# Patient Record
Sex: FEMALE
[De-identification: names, ages, dates, MRNs, and addresses within clinical notes are randomized per-mention and may not be internally consistent; named-entity substitution may affect disease eponyms.]

---

## 2017-05-29 ENCOUNTER — HOSPITAL ENCOUNTER (INPATIENT)
Dept: HOSPITAL 42 - ED | Age: 62
LOS: 2 days | Discharge: HOME | DRG: 440 | End: 2017-05-31
Attending: FAMILY MEDICINE | Admitting: FAMILY MEDICINE
Payer: COMMERCIAL

## 2017-05-29 VITALS — BODY MASS INDEX: 21.2 KG/M2

## 2017-05-29 DIAGNOSIS — K21.9: ICD-10-CM

## 2017-05-29 DIAGNOSIS — F17.210: ICD-10-CM

## 2017-05-29 DIAGNOSIS — I34.0: ICD-10-CM

## 2017-05-29 DIAGNOSIS — M25.552: ICD-10-CM

## 2017-05-29 DIAGNOSIS — E87.6: ICD-10-CM

## 2017-05-29 DIAGNOSIS — G89.29: ICD-10-CM

## 2017-05-29 DIAGNOSIS — K85.90: Primary | ICD-10-CM

## 2017-05-29 DIAGNOSIS — I12.9: ICD-10-CM

## 2017-05-29 DIAGNOSIS — Z87.11: ICD-10-CM

## 2017-05-29 DIAGNOSIS — N18.2: ICD-10-CM

## 2017-05-29 DIAGNOSIS — Z95.810: ICD-10-CM

## 2017-05-29 DIAGNOSIS — Z96.641: ICD-10-CM

## 2017-05-29 DIAGNOSIS — I25.10: ICD-10-CM

## 2017-05-29 DIAGNOSIS — I48.0: ICD-10-CM

## 2017-05-29 LAB
ADD MANUAL DIFF?: NO
ALBUMIN/GLOB SERPL: 1.3 {RATIO} (ref 1.1–1.8)
ALP SERPL-CCNC: 77 U/L (ref 38–133)
ALT SERPL-CCNC: 24 U/L (ref 7–56)
AMYLASE SERPL-CCNC: 122 U/L (ref 35–125)
APPEARANCE UR: CLEAR
APTT BLD: 29.4 SECONDS (ref 23.7–30.8)
AST SERPL-CCNC: 23 U/L (ref 15–39)
BACTERIA #/AREA URNS HPF: (no result) /[HPF]
BASOPHILS # BLD AUTO: 0.02 K/MM3 (ref 0–2)
BASOPHILS NFR BLD: 0.2 % (ref 0–3)
BILIRUB SERPL-MCNC: 0.5 MG/DL (ref 0.2–1.3)
BILIRUB UR-MCNC: NEGATIVE MG/DL
BUN SERPL-MCNC: 21 MG/DL (ref 7–21)
CALCIUM SERPL-MCNC: 9.6 MG/DL (ref 8.4–10.5)
CHLORIDE SERPL-SCNC: 91 MMOL/L (ref 98–107)
CO2 SERPL-SCNC: 29 MMOL/L (ref 21–33)
COLOR UR: YELLOW
EOSINOPHIL # BLD: 0.1 10*3/UL (ref 0–0.7)
EOSINOPHIL NFR BLD: 1.1 % (ref 1.5–5)
ERYTHROCYTE [DISTWIDTH] IN BLOOD BY AUTOMATED COUNT: 13.5 % (ref 11.5–14.5)
GLOBULIN SER-MCNC: 3.4 GM/DL
GLUCOSE SERPL-MCNC: 112 MG/DL (ref 70–110)
GLUCOSE UR STRIP-MCNC: NEGATIVE MG/DL
GRANULOCYTES # BLD: 7.22 10*3/UL (ref 1.4–6.5)
GRANULOCYTES NFR BLD: 79.3 % (ref 50–68)
HCT VFR BLD CALC: 36.7 % (ref 36–48)
INR PPP: 0.97 (ref 0.93–1.08)
KETONES UR STRIP-MCNC: NEGATIVE MG/DL
LEUKOCYTE ESTERASE UR-ACNC: NEGATIVE LEU/UL
LIPASE SERPL-CCNC: 965 U/L (ref 23–300)
LYMPHOCYTES # BLD: 0.9 10*3/UL (ref 1.2–3.4)
LYMPHOCYTES NFR BLD AUTO: 10 % (ref 22–35)
MCH RBC QN AUTO: 35 PG (ref 25–35)
MCHC RBC AUTO-ENTMCNC: 34.6 G/DL (ref 31–37)
MCV RBC AUTO: 101.1 FL (ref 80–105)
MONOCYTES # BLD AUTO: 0.9 10*3/UL (ref 0.1–0.6)
MONOCYTES NFR BLD: 9.4 % (ref 1–6)
PH UR STRIP: 6.5 [PH] (ref 4.7–8)
PLATELET # BLD: 330 10^3/UL (ref 120–450)
PMV BLD AUTO: 9.8 FL (ref 7–11)
POTASSIUM SERPL-SCNC: 2.9 MMOL/L (ref 3.6–5)
PROT SERPL-MCNC: 7.7 G/DL (ref 5.8–8.3)
PROT UR STRIP-MCNC: (no result) MG/DL
RBC # UR STRIP: NEGATIVE /UL
RBC #/AREA URNS HPF: NEGATIVE /HPF (ref 0–2)
SODIUM SERPL-SCNC: 132 MMOL/L (ref 132–148)
SP GR UR STRIP: 1.01 (ref 1–1.03)
UROBILINOGEN UR STRIP-ACNC: 0.2 E.U./DL
WBC # BLD AUTO: 9.1 10^3/UL (ref 4.5–11)
WBC #/AREA URNS HPF: (no result) /HPF (ref 0–6)

## 2017-05-29 RX ADMIN — DEXTROSE AND SODIUM CHLORIDE SCH MLS/HR: 5; 450 INJECTION, SOLUTION INTRAVENOUS at 13:48

## 2017-05-29 RX ADMIN — MORPHINE SULFATE PRN MG: 2 INJECTION, SOLUTION INTRAMUSCULAR; INTRAVENOUS at 14:49

## 2017-05-29 NOTE — ED PDOC
Arrival/HPI





- General


Chief Complaint: Abdominal Pain


Time Seen by Provider: 05/29/17 08:39


Historian: Patient





- History of Present Illness


Narrative History of Present Illness (Text): 





Grazyna Moss is a 62 year old female, whose past medical history includes, 

who presents to the emergency department complaining of diffuse, worsening 

upper abdominal pain for one week. Patient states that she experiences 

associated dizziness, sweats, and nausea. Patient  went to an outpatient center 

who prescribed medications for acid reflux which brought no relief. Patient 

also went to her PMD three days ago, who took blood and told patient that she 

may have pancreatitis. PMD directed patient to the emergency department for 

further evaluation. Patient denies any fevers, recent injury, recent travels, 

trauma, vomiting, chest pain, or any other complaint at this time. Patient 

endorses that she is a daily drinker and has "a couple of drinks" every night, 

either gin or wine.  





PMD: Dr. Roth








Time/Duration: 1 week


Symptom Onset: Gradual


Symptom Course: Unchanged


Severity Level: Mild


Activities at Onset: Light


Context: Home





Past Medical History





- Provider Review


Nursing Documentation Reviewed: Yes





- Infectious Disease


Hx of Infectious Diseases: None





- Tetanus Immunization


Tetanus Immunization: Unknown





- Reproductive


Menopause: Yes





- Cardiac


Hx Hypertension: Yes


Hx Pacemaker: Yes





- Pulmonary


Hx Respiratory Disorders: No





- Neurological


Hx Neurological Disorder: No





- HEENT


Hx HEENT Disorder: Yes (wears glasses)





- Renal


Hx Renal Disorder: No





- Endocrine/Metabolic


Hx Endocrine Disorders: No





- Hematological/Oncological


Hx Blood Disorders: No





- Integumentary


Hx Dermatological Disorder: No





- Musculoskeletal/Rheumatological


Hx Musculoskeletal Disorders: No


Hx Arthritis: Yes ("all over")


Hx Falls: No


Hx Fractures: Yes (L wrist casted, R ft metatarsal casted)


Hx Osteoarthritis: Yes ("all over")





- Gastrointestinal


Hx Gastrointestinal Disorders: No


Hx Gastroesophageal Reflux: Yes


Other/Comment: hemorrhoids a "long time ago"





- Genitourinary/Gynecological


Hx Genitourinary Disorders: No





- Psychiatric


Hx Psychophysiologic Disorder: No


Hx Anxiety: Yes


Hx Substance Use: No





- Past Surgical History


Past Surgical History: Unable to Obtain





- Surgical History


Hx Cardiac Catheterization: Yes (2002 neg)


Hx Orthopedic Surgery: Yes (herniated disc c3 and 4,right hip replacement)


Other/Comment: R hip replacement





- Anesthesia


Hx Anesthesia: No


Hx Anesthesia Reactions: No


Hx Malignant Hyperthermia: No





- Suicidal Assessment


Feels Threatened In Home Enviroment: No





Family/Social History





- Physician Review


Nursing Documentation Reviewed: Yes


Family/Social History: No Known Family HX


Smoking Status: Former Smoker


Hx Alcohol Use: Yes (occasional)


Frequency of alcohol use: Socially


Hx Substance Use: No


Hx Substance Use Treatment: No





Allergies/Home Meds


Allergies/Adverse Reactions: 


Allergies





Penicillins Allergy (Verified 05/29/17 08:25)


 RASH








Home Medications: 


 Home Meds











 Medication  Instructions  Recorded  Confirmed


 


Valsartan/Hydrochlorothiazide 1 tab PO DAILY 11/04/14 05/29/17





[Valsartan and Hydrochlorothiazide   





12.5 mg-160]   


 


Amiodarone HCl [Pacerone] 100 mg PO DAILY 05/29/17 05/29/17


 


Metoprolol Succinate [Toprol XL] 100 mg PO DAILY 05/29/17 05/29/17














Review of Systems





- Review of Systems


Constitutional: Night Sweats.  absent: Fevers


Eyes: absent: Vision Changes


ENT: absent: Hearing Changes


Respiratory: absent: SOB, Cough


Cardiovascular: absent: Chest Pain


Gastrointestinal: Abdominal Pain, Nausea, Appetite Changes.  absent: Vomiting


Genitourinary Female: absent: Dysuria, Frequency


Musculoskeletal: absent: Arthralgias


Skin: absent: Rash, Pruritis


Neurological: Dizziness


Endocrine: absent: Polyuria


Hemo/Lymphatic: absent: Easy Bleeding


Psychiatric: absent: Depression





Physical Exam





- Physical Exam


Narrative Physical Exam (Text): 


Constitutional: No acute distress.


Head: Normocephalic.  Atraumatic.  


Eyes:  PERRL.


ENT:  Moist mucous membranes.


Neck:  Supple.


Cardiovascular:  Regular rate.


Chest: No tenderness.


Respiratory:  Clear to auscultation bilaterally.


GI:  Epigastric tenderness with guarding. 


Back:  No CVA tenderness.


Musculoskeletal:  No tenderness or swelling of extremities.


Skin:  No rash. 


Neurologic:  Alert, no focal deficit.





Vital Signs Reviewed: Yes


Vital Signs











  Temp Pulse Resp BP Pulse Ox


 


 05/29/17 11:29  98.3 F  60  18  100/70  100


 


 05/29/17 09:30   66  18  94/55 L  99


 


 05/29/17 08:38  97.8 F  80  18  100/59 L  97











Temperature: Afebrile


Blood Pressure: Hypotensive


Pulse: Regular


Respiratory Rate: Normal


Appearance: Positive for: Well-Appearing, Non-Toxic, Comfortable


Pain Distress: None


Mental Status: Positive for: Alert and Oriented X 3





Medical Decision Making


ED Course and Treatment: 


Impression:





62 year old female complaining of diffuse abdominal pain for one week.





Plan:


-- EKG 


-- Chest X-ray


-- Urinalysis and Urine Culture


-- Type and Screen


-- Labs


-- Morphine and IV Fluids


-- Reassess and disposition





Prior Visits:


Notes and results from previous visits were reviewed. Patient last seen in ED 

on 02/03/15 for shortness of breath and chest pain while shoveling snow. 

Patient was admitted to hospitalist care for further evaluation.





Progress Notes:





EKG:


Ordered, reviewed, and independently interpreted the EKG.


Rate : 73 BPM


Rhythm : NSR


Interpretation : No ST- T elevations or depressions. 


Comparison : No previous EKG for comparison.





05/29/17 09:55


Chest X-ray:


Creator : Chris Rahman MD


FINDINGS:


LUNGS:No active pulmonary disease.


PLEURA:No significant pleural effusion identified, no pneumothorax apparent.


CARDIOVASCULAR:Normal.


OSSEOUS STRUCTURES:No significant abnormalities.


VISUALIZED UPPER ABDOMEN:Normal.


OTHER FINDINGS:Dual lead pacemaker


IMPRESSION:


No active disease.





Dr. Crockett accepts patient to Dr. Roth's service. He states he will order GI 

consult at later time as patient does not have private GI. Agrees with US, 

potassium replacement, IVF, NPO.





- Lab Interpretations


Lab Results: 








 05/29/17 08:48 





 05/29/17 08:45 





 Lab Results





05/29/17 10:15: Urine Color Yellow, Urine Appearance Clear, Urine pH 6.5, Ur 

Specific Gravity 1.010, Urine Protein Trace H, Urine Glucose (UA) Negative, 

Urine Ketones Negative, Urine Blood Negative, Urine Nitrate Negative, Urine 

Bilirubin Negative, Urine Urobilinogen 0.2, Ur Leukocyte Esterase Negative, 

Urine RBC Negative, Urine WBC 0 - 2, Ur Epithelial Cells 1 - 3, Urine Bacteria 

Small


05/29/17 09:10: Blood Type A POSITIVE, Antibody Screen Negative, BBK History 

Checked Patient has bt


05/29/17 08:48: PT 10.5, INR 0.97, APTT 29.4


05/29/17 08:48: WBC 9.1  D, RBC 3.63, Hgb 12.7, Hct 36.7, .1, MCH 35.0, 

MCHC 34.6, RDW 13.5, Plt Count 330, MPV 9.8, Gran % 79.3 H, Lymph % (Auto) 10.0 

L, Mono % (Auto) 9.4 H, Eos % (Auto) 1.1 L, Baso % (Auto) 0.2, Gran # 7.22 H, 

Lymph # 0.9 L, Mono # 0.9 H, Eos # 0.1, Baso # 0.02


05/29/17 08:45: Sodium 132, Potassium 2.9 L*, Chloride 91 L, Carbon Dioxide 29, 

Anion Gap 15, BUN 21, Creatinine 1.5 H, Est GFR ( Amer) 42, Est GFR (Non-

Af Amer) 35, Random Glucose 112 H, Calcium 9.6, Total Bilirubin 0.5, AST 23, 

ALT 24, Alkaline Phosphatase 77, Lactate Dehydrogenase 360, Total Protein 7.7, 

Albumin 4.3, Globulin 3.4, Albumin/Globulin Ratio 1.3, Amylase 122, Lipase 965 H








I have reviewed the lab results: Yes





- RAD Interpretation


Radiology Orders: 








05/29/17 08:48


CHEST PORTABLE [RAD] Stat 





05/29/17 09:54


ABDOMEN COMPLETE [US] Stat 














- Medication Orders


Current Medication Orders: 








Sodium Chloride (Sodium Chloride 0.9%)  1,000 mls @ 100 mls/hr IV .Q10H KANDICE


   Last Admin: 05/29/17 11:48  Dose: 100 mls/hr





Discontinued Medications





Sodium Chloride (Sodium Chloride 0.9%)  1,000 mls @ 999 mls/hr IV .Q1H1M STA


   Stop: 05/29/17 09:48


   Last Admin: 05/29/17 09:10  Dose: 999 mls/hr





Potassium Chloride (Potassium Chloride 10 Meq/100 Ml)  10 meq in 100 mls @ 100 

mls/hr IVPB ONCE ONE


   Stop: 05/29/17 10:49


   Last Admin: 05/29/17 10:02  Dose: 100 mls/hr





Morphine Sulfate (Morphine)  2 mg IVP STAT STA


   Stop: 05/29/17 08:50


   Last Admin: 05/29/17 09:02  Dose: 2 mg





Re-Assess: MAR Pain Assessment


 Document     05/29/17 10:02  SRE  (Rec: 05/29/17 11:46  SRE  [id number])


     Pain Reassessment


      Is this a pain reassessment?               Yes


     Sleep


      Is patient sleeping during reassessment?   Yes


     Location


      Pain Location Body Site                    Abdomen














- Scribe Statement


The provider has reviewed the documentation as recorded by the Jhon Cox





Provider Scribe Attestation:


All medical record entries made by the Scribe were at my direction and 

personally dictated by me. I have reviewed the chart and agree that the record 

accurately reflects my personal performance of the history, physical exam, 

medical decision making, and the department course for this patient. I have 

also personally directed, reviewed, and agree with the discharge instructions 

and disposition.





Disposition/Present on Arrival





- Present on Arrival


Any Indicators Present on Arrival: No


History of DVT/PE: No


History of Uncontrolled Diabetes: No


Urinary Catheter: No


History of Decub. Ulcer: No


History Surgical Site Infection Following: None





- Disposition


Have Diagnosis and Disposition been Completed?: Yes


Diagnosis: 


 Pancreatitis, Hypokalemia





Disposition: HOSPITALIZED


Disposition Time: 09:55


Patient Plan: Admission


Condition: GUARDED

## 2017-05-29 NOTE — CARD
--------------- APPROVED REPORT --------------





EKG Measurement

Heart [id number]

ID [id number]

[id number]

[id number]

[id number]



<Conclusion>

*** Poor data quality, interpretation may be adversely affected

Normal sinus rhythm

Possible Left atrial enlargement

Septal infarct, age undetermined

Abnormal ECG

## 2017-05-29 NOTE — RAD
HISTORY:

epigastric pain  



COMPARISON:

03/02/2015 



FINDINGS:



LUNGS:

No active pulmonary disease.



PLEURA:

No significant pleural effusion identified, no pneumothorax apparent.



CARDIOVASCULAR:

Normal.



OSSEOUS STRUCTURES:

No significant abnormalities.



VISUALIZED UPPER ABDOMEN:

Normal.



OTHER FINDINGS:

Dual lead pacemaker



IMPRESSION:

No active disease.

## 2017-05-29 NOTE — HP
HISTORY OF PRESENT ILLNESS:  We were called earlier on today to consider 
admission for this 62-year-old female who was seen in our office on the , 
that is approximately 4 days ago, and subsequent labs done as she had some 
abdominal pain revealed an elevated lipase to the degree that Dr. Jodi George recommended admission on , but she is here today, .   On 
Emergency Room evaluation the patient complains of diffuse upper abdominal pain 
ongoing for 1 week.  In our office the added information was prior history of 
ulcers.  She also has some associated dizziness, sweats and nausea and had been 
evaluated prior to seeing us by an outpatient center who prescribed acid reflux 
medications before coming to the office and brought no relief.  The patient was 
also seen by Dr. Chavez and once the labs returned immediately the 
suspicion for pancreatitis due to elevated pancreatic enzymes.  She has had no 
fevers or recent injuries.  No recent travel and no vomiting to go along with 
the present abdominal pain.  She does admit to drinking a couple of drinks 
every night either gin or wine.  Review of her office chart is remarkable for 
essential hypertension, history of ventricular tachycardia and subsequent 
pacemaker insertion.  We are unable to find out at the moment who the 
cardiologist was.  She also had a history of macrocytosis, osteoarthritis of 
the lumber spine, cervical spine degeneration, left hip pain.  She continues to 
smoke, a history of first herpes zoster, CKD stage II, history of occasional 
dizziness and cough and ASHD.



PAST SURGICAL HISTORY:  Remarkable for laminectomy, right hip replacement in 
, pacemaker insertion and defibrillation was on 2015.



FAMILY HISTORY:  Remarkable for father who  at age 42 of heart disease.  
Her spouse is alive.  Her mother  at age 62 of liver disease.  She has 2 
sisters.



SOCIAL HISTORY:  She does not engage in any physical activity.  She lives with 
her spouse and works full time and smokes full time and admits to about 6-10 
cigarette habit a day.  The drinks admitted in our office are to 2-3 times a 
week and on a typical day she has 1-2 drinks and on no occasion does she ever 
go over 6.  She denies using any caffeine.



ALLERGIES:  PENICILLIN.



REVIEW OF SYSTEMS:  In the office were complaining of epigastric and mid 
abdominal pain for 4 days, now 7 days.  The pain was dull and constant, 
associated with a loss of appetite and bloating.  No fever, nausea or vomiting.
  The omeprazole yielded no relief and she has lost 8 pounds since 2 weeks 
before the present visit.



PHYSICAL EXAMINATION:   From the emergency room:  

VITAL SIGNS:  Show a temperature of 97.8, a weight of 124; weighing 121 in our 
office 2 days ago.  She is with regular pulse of 66, blood pressure 100/70, 
respiratory rate of 18 with 100% O2 sat by pulse oximetry.

GENERAL:  Alert and oriented, in mild discomfort.  The pain scale in the office 
was a 4/10.  She was conversant in the office and declined immediate referral 
to the Emergency Room when Dr. Chavez spoke with her after the results 
were evident on Friday and she was endorsed to me on the weekend as we were 
expecting an admission.  Blood pressure in the office was normal at 121/73 
versus our hypotensive notes here.

HEENT:  Normocephalic, atraumatic.  Nose:  Septum is intact and no mucosal 
erythema.  Throat:  Clear with no lesions on the pharynx.

NECK:  No cervical lymphadenopathy.  Neck is supple, with full range of motion.
  Thyroid appears normal.

CARDIAC:  Showed no murmurs, rubs or gallops, was regular in rate and rhythm.

LUNGS:  Have diminished breath sounds with no wheezes, rales or rhonchi.

ABDOMEN:  Soft with no guarding and bowel sounds were present.  There is 
tenderness on deep palpation of the epigastric and periumbilical area.

EXTREMITIES:  Showed no clubbing, cyanosis or edema.

NEUROLOGIC:  Intact.



LABORATORY DATA:  On admission showed WBC count of 9100 with a slight shift to 
the left with 79.3% granulocytes and 10% lymphocytes. however does not mention 
any bands.  PT/INRs were normal.  Potassium is low, so she was immediately 
supplemented.  Has no diarrhea, so we are not sure of the etiology of the 
hypokalemia.  Creatinine is 1.5, glucose is 112, amylase is 122, lipase is 965 
and in the office was in excess of 1200.  Urine color is fair, clear.  Chest x-
ray was consistent just with pacemaker visualization, otherwise unremarkable.  
The patient received a K rider and was placed on IV fluids.  We have now 
changed the rate to more and she was given morphine in the ER with resolution.  
Review of her labs show that she is on omeprazole 40, amiodarone 200, 
hydrochlorothiazide 12.5, valsartan 80, clonazepam 1 mg twice a day, metoprolol 
.



IMPRESSION:  Pancreatitis, abdominal pain, evaluate with GI.



SECONDARY DIAGNOSIS:  History of pacemaker with continued use of amiodarone.  
We will ask for a cardiac consult with Dr. Catalan.



PLAN:  Continue at present her IV fluids, keep n.p.o. and await for further 
recommendations from cardio and GI.  Serial labs were ordered for tomorrow and 
she will return under the care of Dr. Chavez as we are covering for 
her today.





__________________________________________

Manjinder Crockett MD







cc:   



DD: 2017 13:08:03  73

TT: 2017 18:16:21

Job # [id number]

kaitlin FELDMAN

## 2017-05-30 LAB
ADD MANUAL DIFF?: NO
ALBUMIN/GLOB SERPL: 1.1 {RATIO} (ref 1.1–1.8)
ALP SERPL-CCNC: 68 U/L (ref 38–133)
ALT SERPL-CCNC: 20 U/L (ref 7–56)
AMYLASE SERPL-CCNC: 102 U/L (ref 35–125)
AST SERPL-CCNC: 23 U/L (ref 15–39)
BASOPHILS # BLD AUTO: 0.03 K/MM3 (ref 0–2)
BASOPHILS NFR BLD: 0.5 % (ref 0–3)
BILIRUB SERPL-MCNC: 0.3 MG/DL (ref 0.2–1.3)
BUN SERPL-MCNC: 14 MG/DL (ref 7–21)
CALCIUM SERPL-MCNC: 9.2 MG/DL (ref 8.4–10.5)
CHLORIDE SERPL-SCNC: 96 MMOL/L (ref 98–107)
CO2 SERPL-SCNC: 33 MMOL/L (ref 21–33)
CRP SERPL-MCNC: 6.9 MG/DL (ref ?–0.8)
EOSINOPHIL # BLD: 0.2 10*3/UL (ref 0–0.7)
EOSINOPHIL NFR BLD: 3.7 % (ref 1.5–5)
ERYTHROCYTE [DISTWIDTH] IN BLOOD BY AUTOMATED COUNT: 13.6 % (ref 11.5–14.5)
GLOBULIN SER-MCNC: 3.4 GM/DL
GLUCOSE SERPL-MCNC: 118 MG/DL (ref 70–110)
GRANULOCYTES # BLD: 4.09 10*3/UL (ref 1.4–6.5)
GRANULOCYTES NFR BLD: 63.8 % (ref 50–68)
HCT VFR BLD CALC: 35.3 % (ref 36–48)
LIPASE SERPL-CCNC: 649 U/L (ref 23–300)
LYMPHOCYTES # BLD: 1.4 10*3/UL (ref 1.2–3.4)
LYMPHOCYTES NFR BLD AUTO: 21.2 % (ref 22–35)
MCH RBC QN AUTO: 35.6 PG (ref 25–35)
MCHC RBC AUTO-ENTMCNC: 34.6 G/DL (ref 31–37)
MCV RBC AUTO: 102.9 FL (ref 80–105)
MONOCYTES # BLD AUTO: 0.7 10*3/UL (ref 0.1–0.6)
MONOCYTES NFR BLD: 10.8 % (ref 1–6)
PLATELET # BLD: 309 10^3/UL (ref 120–450)
PMV BLD AUTO: 9.8 FL (ref 7–11)
POTASSIUM SERPL-SCNC: 3.2 MMOL/L (ref 3.6–5)
PROT SERPL-MCNC: 7.1 G/DL (ref 5.8–8.3)
SODIUM SERPL-SCNC: 136 MMOL/L (ref 132–148)
WBC # BLD AUTO: 6.4 10^3/UL (ref 4.5–11)

## 2017-05-30 RX ADMIN — DEXTROSE AND SODIUM CHLORIDE SCH MLS/HR: 5; 450 INJECTION, SOLUTION INTRAVENOUS at 22:12

## 2017-05-30 RX ADMIN — MORPHINE SULFATE PRN MG: 2 INJECTION, SOLUTION INTRAMUSCULAR; INTRAVENOUS at 20:19

## 2017-05-30 RX ADMIN — MORPHINE SULFATE PRN MG: 2 INJECTION, SOLUTION INTRAMUSCULAR; INTRAVENOUS at 11:26

## 2017-05-30 RX ADMIN — METOPROLOL SUCCINATE SCH MG: 100 TABLET, EXTENDED RELEASE ORAL at 10:27

## 2017-05-30 RX ADMIN — MORPHINE SULFATE PRN MG: 2 INJECTION, SOLUTION INTRAMUSCULAR; INTRAVENOUS at 04:32

## 2017-05-30 RX ADMIN — DEXTROSE AND SODIUM CHLORIDE SCH MLS/HR: 5; 450 INJECTION, SOLUTION INTRAVENOUS at 04:32

## 2017-05-30 NOTE — CON
DATE: 05/30/2017



REQUESTING PHYSICIAN:  Dr. Crockett.  



REASON FOR CONSULTATION:  I have been asked to see this 62-year-old female who comes to the hospital 
with a 1-week history of progressively worsening diffuse abdominal pain.  The patient states that the
 pain is colicky in nature throughout the abdomen and extending into the substernal area.  Pain becam
e progressively worse over several days prompting the patient to go to her medical doctor.  Routine b
lood work in her medical doctor's office apparently showed elevated amylase and lipase at which point
 the patient was instructed to come to the Emergency Room.  In the Emergency Room, the patient was fo
und to have an elevated lipase of 965 with a normal amylase.  Ultrasound of the abdomen was negative 
for stones or bile duct dilatation.  The liver appeared normal.  She denies any nausea or vomiting, f
ruth ore chills.  The patient was diagnosed with peptic ulcer disease 20 years ago for which she too
k PPI for several years but stopped years ago.



PAST MEDICAL HISTORY:  Notable for hypertension, ventricular tachycardia requiring pacemaker insertio
n, DJD of lumbar spine, chronic left hip pain, nicotine use, CKD stage II, and coronary artery diseas
e.



PAST SURGICAL HISTORY:  Notable for permanent pacemaker placement, right hip replacement, laminectomy
.



SOCIAL HISTORY:  She smokes up to a half pack of cigarettes per day.  She consumes alcohol socially.



FAMILY HISTORY:  Notable for father dying of heart disease in his 40s.  Mother with liver disease in 
her 60s at that time of her death.



REVIEW OF SYSTEMS:  A 14-point review of systems is notable for abdominal pain, substernal chest pain
, loss of appetite.



PHYSICAL EXAMINATION:

GENERAL:  Well-developed female lying in bed, comfortable.  Her abdominal pain has greatly improved.

VITAL SIGNS:  Reveal temperature of 97.7, blood pressure 108/68, heart rate is 60.

HEENT:  Reveals sclerae to be white, conjunctivae pink.

NECK:  Supple.

CHEST:  Lungs are clear.

HEART:  Reveals regular rate and rhythm.

ABDOMEN:  Soft, nontender, no mass.

EXTREMITIES:  Show no edema.



LABORATORY DATA:  Reveal white blood cell count 6.4, hemoglobin 12.2.  Chemistries reveal electrolyte
s with potassium of 3.2, lipase down to 649, amylase is normal.



IMPRESSION:  A 62-year-old female with a 1-week history of diffuse abdominal pain, history of peptic 
ulcer disease with ultrasound negative for gallstones, suggestive of pancreatitis.  Etiology of pancr
eatitis is unclear at this point. 



RECOMMENDATIONS:

1.  Will check serum triglyceride level.

2.  Will request a CT of the abdomen with pancreatic protocol.

3.  Will start the patient on a clear liquid diet.





__________________________________________

Alexey Lakhani MD







cc:



DD: 05/30/2017 11:07:26  79

TT: 05/30/2017 11:33:51

Confirmation # [id number]

Dictation # [id number]

mn

## 2017-05-30 NOTE — CON
DATE: 2017



SERVICE:  Cardiology.



CONSULTING PHYSICIAN:  Dr. Claudio Catalan.



REASON FOR CONSULTATION:  Cardiac evaluation and adjustment of cardiac medications, history of pacema
ker, admitted with acute pancreatitis.



BRIEF CLINICAL HISTORY:  This is a 62-year-old female with past medical history significant for hyper
tension, paroxysmal atrial fibrillation, ventricular tachycardia, status post permanent pacemaker by 
Dr. Hawkins at [facility] in 3/2015, admitted with acute pancreatitis.  Denies any michael
st pain, denies any shortness of breath, denies any palpitation.  Denies any dyspnea on exertion.



PAST MEDICAL HISTORY:  Significant for hypertension, paroxysmal atrial fibrillation, sick sinus syndr
ome possibly, status post pacemaker at [facility], status post permanent pacemaker 3/
2015.



PAST SURGICAL HISTORY:  Significant for laminectomy in the past, right hip replacement in , jeni
ectomy , status post pacemaker in 3/2015.



FAMILY HISTORY:  Significant for brother  at the age of 42 with heart problems.



SOCIAL HISTORY:  No history of smoking.  No history of alcohol abuse.



ALLERGIES:  PENICILLIN.



CURRENT MEDICATIONS:  The patient is taking amiodarone 200 mg daily, Toprol- mg daily, valsarta
n 12.5/160.



REVIEW OF SYSTEMS:  As per HPI.



PHYSICAL EXAMINATION:

VITAL SIGNS:  Temperature afebrile, heart rate 60, blood pressure 108/68.

HEENT:  PERRLA.  Extraocular muscles intact.

NECK:  Supple.  No carotid bruits.  No thyromegaly.

CHEST:  Clear to auscultation.

HEART:  S1, S2 regular.

ABDOMEN:  Soft.

EXTREMITIES:  Clubbing and cyanosis negative.



LABORATORY DATA:  Blood workup as follows:  WBC 6.4, hemoglobin 12.1, hematocrit 35.3, platelet count
 309.  Chemistry shows sodium 137, potassium 3.2, chloride 97, carbon dioxide 33, anion gap of 10, BU
N 14, creatinine 1.0.  Triglyceride 199, amylase 102, and lipase 609.



IMPRESSION:  Hypokalemia, acute pancreatitis, history of sick sinus syndrome, status post permanent p
acemaker 3/2015 at [facility] after having paroxysmal atrial fibrillation and ventric
ular tachycardia.  History of hip replacement in , history of pacemaker 3/2015, history of robin
ctomy .  The patient had echocardiography done, normal chamber size, ejection fraction 55%, mild 
mitral regurgitation dated 03/3/2015.



RECOMMENDATION:  We will continue amiodarone 100 mg daily, continue losartan.  We will put metoprolol
 100 mg daily.  Will follow with you.



Thank you, Dr. Crockett, for providing us the opportunity in taking care of the patient.  



We will get lipid profile, TSH, hemoglobin A1c also.





__________________________________________

Claudio Catalan MD







cc:



DD: 2017 11:55:17  305

TT: 2017 13:10:13

Confirmation # [id number]

Dictation # [id number]

tn

## 2017-05-30 NOTE — PN
DATE: 05/30/2017



SUBJECTIVE:  The patient was admitted for acute pancreatitis yesterday morning.
  She is n.p.o.  She is feeling better.  Her pain improved.  The patient denies 
any nausea or vomiting.  She denies any diarrhea.



PHYSICAL EXAMINATION:

VITAL SIGNS:  The patient is afebrile.  Temperature 97, pulse 60 regular, blood 
pressure 108/68, respiratory rate 20.  Her oxygen saturation is 99% on room air.

GENERAL:  The patient is comfortable in bed, alert, awake, oriented.

HEENT:  Head is normocephalic, atraumatic.  Oral mucosa is moist.

NECK:  Supple.

LUNGS:  Clear to auscultation.

HEART:  Regular rhythm and rate.

ABDOMEN:  Soft.  There is slight tenderness on deep palpation in the epigastric 
area.  Bowel sounds are positive.

EXTREMITIES:  With no edema.



LABORATORY DATA:  This morning, CBC with stable WBC 6.4, hemoglobin 12.2, 
hematocrit 35.3.  Chemistry with normal sodium 132, potassium improved to 3.2 
this morning.  Her renal function is normal.  Her pancreatic enzymes/lipase is 
trending down; it is 649 this morning.  Abdominal ultrasound showed normal 
pancreas with no signs of inflammation or pseudocysts.  



ASSESSMENT:

1.  Acute pancreatitis, clinically improved with trending down lipase level.

2.  Hypokalemia.

3.  History of ventricular tachycardia, status post defibrillator.



PLAN OF TREATMENT:  Continue IV fluids.  Will replace potassium.  Will start 
clear liquid if okay with GI.





__________________________________________

Amanda Chavez MD







cc:   



DD: 05/30/2017 09:16:27  154

TT: 05/30/2017 09:38:30

Confirmation # [id number]

Dictation # [id number]

kaitlin FELDMAN

## 2017-05-30 NOTE — US
HISTORY:

epigastric pain



COMPARISON:

None.



TECHNIQUE:

Sonographic evaluation of the abdomen.



FINDINGS:



LIVER:

Measures 14.7 cm.  Normal echogenicity of the liver parenchyma. No 

mass. No intrahepatic bile duct dilatation.



GALLBLADDER:

Unremarkable. No gallstones.



COMMON BILE DUCT:

Measures 3 mm. No stones. No dilatation.



PANCREAS:

Unremarkable as visualized. No mass. No ductal dilatation.



RIGHT KIDNEY:

Measures 9.8cm. Normal echogenicity. No calculus, mass, or 

hydronephrosis.



LEFT KIDNEY:

Measures 10.1cm. Normal echogenicity. No calculus, mass, or 

hydronephrosis.



SPLEEN:

Normal in size and contour. No mass.



AORTA:

No aneurysmal dilatation. 



IVC:

Unremarkable. 



OTHER FINDINGS:

None. 



IMPRESSION:

Unremarkable abdominal sonogram.

## 2017-05-31 VITALS — RESPIRATION RATE: 20 BRPM | OXYGEN SATURATION: 97 % | TEMPERATURE: 98.1 F | HEART RATE: 60 BPM

## 2017-05-31 VITALS — DIASTOLIC BLOOD PRESSURE: 61 MMHG | SYSTOLIC BLOOD PRESSURE: 94 MMHG

## 2017-05-31 LAB
ADD MANUAL DIFF?: NO
ALBUMIN/GLOB SERPL: 1.1 {RATIO} (ref 1.1–1.8)
ALP SERPL-CCNC: 59 U/L (ref 38–133)
ALT SERPL-CCNC: 21 U/L (ref 7–56)
AST SERPL-CCNC: 19 U/L (ref 15–39)
BASOPHILS # BLD AUTO: 0.03 K/MM3 (ref 0–2)
BASOPHILS NFR BLD: 0.5 % (ref 0–3)
BILIRUB SERPL-MCNC: 0.4 MG/DL (ref 0.2–1.3)
BUN SERPL-MCNC: 9 MG/DL (ref 7–21)
CALCIUM SERPL-MCNC: 9.1 MG/DL (ref 8.4–10.5)
CHLORIDE SERPL-SCNC: 100 MMOL/L (ref 98–107)
CO2 SERPL-SCNC: 31 MMOL/L (ref 21–33)
EOSINOPHIL # BLD: 0.3 10*3/UL (ref 0–0.7)
EOSINOPHIL NFR BLD: 4.7 % (ref 1.5–5)
ERYTHROCYTE [DISTWIDTH] IN BLOOD BY AUTOMATED COUNT: 13.6 % (ref 11.5–14.5)
GLOBULIN SER-MCNC: 3 GM/DL
GLUCOSE SERPL-MCNC: 127 MG/DL (ref 70–110)
GRANULOCYTES # BLD: 3.38 10*3/UL (ref 1.4–6.5)
GRANULOCYTES NFR BLD: 59 % (ref 50–68)
HCT VFR BLD CALC: 33.9 % (ref 36–48)
LIPASE SERPL-CCNC: 485 U/L (ref 23–300)
LYMPHOCYTES # BLD: 1.5 10*3/UL (ref 1.2–3.4)
LYMPHOCYTES NFR BLD AUTO: 26.4 % (ref 22–35)
MAGNESIUM SERPL-MCNC: 1.9 MG/DL (ref 1.7–2.2)
MCH RBC QN AUTO: 35.1 PG (ref 25–35)
MCHC RBC AUTO-ENTMCNC: 33.9 G/DL (ref 31–37)
MCV RBC AUTO: 103.4 FL (ref 80–105)
MONOCYTES # BLD AUTO: 0.5 10*3/UL (ref 0.1–0.6)
MONOCYTES NFR BLD: 9.4 % (ref 1–6)
PHOSPHATE SERPL-MCNC: 2.9 MG/DL (ref 2.5–4.5)
PLATELET # BLD: 312 10^3/UL (ref 120–450)
PMV BLD AUTO: 9.7 FL (ref 7–11)
POTASSIUM SERPL-SCNC: 3.6 MMOL/L (ref 3.6–5)
PROT SERPL-MCNC: 6.4 G/DL (ref 5.8–8.3)
SODIUM SERPL-SCNC: 135 MMOL/L (ref 132–148)
WBC # BLD AUTO: 5.7 10^3/UL (ref 4.5–11)

## 2017-05-31 RX ADMIN — METOPROLOL SUCCINATE SCH: 100 TABLET, EXTENDED RELEASE ORAL at 12:08

## 2017-05-31 RX ADMIN — MORPHINE SULFATE PRN MG: 2 INJECTION, SOLUTION INTRAMUSCULAR; INTRAVENOUS at 05:49

## 2017-05-31 NOTE — PN
DATE: 05/31/2017



SUBJECTIVE:  The patient is lying in bed, comfortable.  The patient states that her abdominal pain ha
s essentially resolved.  She denies any nausea, vomiting.



PHYSICAL EXAMINATION:

VITAL SIGNS:  Reveal temperature of 98.1, blood pressure of 94/60, heart rate of 60.

ABDOMEN:  Soft, nontender.



LABORATORY DATA:  Hemoglobin is 11.5.  Chemistries reveal lipase down to 485.  Triple phase CT scan o
f the abdomen with pancreatic protocol results are pending.



IMPRESSION:  A 62-year-old female with history of peptic ulcer disease with increasing abdominal pain
, elevated lipase, rule out pancreatitis.  Her serum triglyceride level is 199.  Her lipase is trendi
ng downward.



RECOMMENDATIONS:

1.  Await results of CT scan of the abdomen with pancreatic protocol.

2.  Advance diet as tolerated.  If CT of the pancreas is okay, the patient can be discharged home wit
h plans for outpatient endoscopy.





__________________________________________

Alexey Lakhani MD







cc:



DD: 05/31/2017 10:19:05  79

TT: 05/31/2017 10:35:15

Confirmation # [id number]

Dictation # [id number]

tn

## 2017-05-31 NOTE — DS
HISTORY OF PRESENT ILLNESS:  The patient was admitted for acute pancreatitis 
with elevated lipase at 900 range.  The patient is feeling better.  The patient 
is tolerating diet.  She denies any nausea, vomiting.  Her abdominal pain 
improved significantly.



PHYSICAL EXAMINATION:

VITAL SIGNS:  Stable.  Temperature 98.1, blood pressure 98/68, respiratory rate 
20, pulse 60 regular.

GENERAL:  The patient is alert, awake, oriented x 3.

HEENT:  Head is normocephalic, atraumatic.  Oral mucosa is moist.

NECK:  Supple.

LUNGS:  Clear to auscultation.

HEART:  Regular rhythm and rate.

ABDOMEN:  Soft.  There is slight tenderness on very deep palpation in the 
epigastric area, but no rebound or muscle guarding or masses palpable.  Bowel 
sounds are positive.

EXTREMITIES:  With no edema.



LABORATORY DATA:  This morning, stable CBC with WBC 5.7, hemoglobin 11.5, 
hematocrit 33.9, platelet count 312.  Chemistry with normal sodium 135, 
potassium 3.6, normal renal function.  Her lipase is trending down it is at 485 
this morning, down from 649 yesterday.  Her abdominal ultrasound was normal.  
The patient had pancreatic CT scan, was reported as mild pancreatitis.



DISCHARGE DIAGNOSES:

1.  Acute pancreatitis, clinically improved with uncertain etiology.  Negative 
for gallstones.

2.  History of hypokalemia, improved.

3.  History of ventricular tachycardia, status post defibrillator.



PLAN OF TREATMENT:  The patient will be discharged home today.  She will be on 
cardiac healthy diet and was advised to avoid fried food, avoid alcohol.  The 
patient will be maintained on her chronic medications:  Amiodarone 100 mg daily
, losartan /12.5 daily and metoprolol succinate 100 mg daily.  She will 
get a prescription for Percocet 5/325 one tablet as needed for pain.  She was 
advised to follow up with primary care doctor next week on Tuesday.  She will 
also make appointment with gastroenterologist in 2 weeks.  Will need endoscopy 
and colonoscopy outpatient.





__________________________________________

Amanda Chavez MD







cc:   



DD: 05/31/2017 16:14:47  154

TT: 05/31/2017 17:14:09

Job # [id number]

jn

FRANCIA

## 2017-05-31 NOTE — PN
DATE: 05/31/2017



The patient is in room 373, bed 3.



REASON FOR CONSULTATION AND FOLLOWUP:  Acute pancreatitis, history of pacemaker insertion, paroxysmal
 atrial fibrillation, ventricular tachycardia.



HISTORY OF PRESENT ILLNESS:  The patient is a 62-year-old female with past medical history significan
t for hypertension, paroxysmal atrial fibrillation, ventricular tachycardia, status post permanent pa
cemaker insertion by Dr. Hawkins at [facility] in 3/2015, admitted with acute pancreat
itis.  The patient lying flat in bed without any chest pain, shortness of breath, or palpitation.



PHYSICAL EXAMINATION:

VITAL SIGNS:  Blood pressure 94/60, respirations 20, pulse 60, temperature 98.1.

HEAD:  Normocephalic.

EYES:  Pupils normal.  Conjunctivae are slightly pale.

NECK:  JVP low.  Carotids equal.

THORAX:  AP diameter normal.

LUNGS:  Clear.

CARDIOVASCULAR:  S1, S2.

ABDOMEN:  Soft.  Bowel sounds normal.

EXTREMITIES:  No clubbing, no cyanosis.



LABORATORY DATA:  WBC 5.7, hemoglobin 11.5, hematocrit 33.9, platelets 312.  Sodium 135, potassium 3.
6, BUN 9, creatinine 0.9.  AST, ALT normal.  Lipase 485.  Amylase 102.  Yesterday, lipase was 649.



DIAGNOSES:  Acute pancreatitis, history of sick sinus syndrome, status post permanent pacemaker inser
tion, paroxysmal atrial fibrillation, history of ventricular tachycardia, status post permanent pacem
paxton insertion.  Echo showed normal chamber size, ejection fraction of 55%, mild mitral regurgitation
, dated 3/3/2015.



PLAN:  The patient's clinical cardiac status is stable at this time.  Amiodarone 100 mg p.o. daily, l
osartan 50 mg p.o. daily, Protonix 40 mg IV daily.  We will continue present therapy and will follow 
with you.





__________________________________________

Claudio Silva MD







cc:



DD: 05/31/2017 12:48:20  306

TT: 05/31/2017 13:13:51

Confirmation # [id number]

Dictation # [id number]

rn

## 2017-06-01 NOTE — CT
PROCEDURE:  CT Abdomen and Pelvis with contrast



HISTORY:

pancreatitis



COMPARISON:

None.



TECHNIQUE:

Contrast dose: 150 cc of Omnipaque 350



Radiation dose:



Total exam DLP = 722 mGy-cm.



This CT exam was performed using one or more of the following dose 

reduction techniques: Automated exposure control, adjustment of the 

mA and/or kV according to patient size, and/or use of iterative 

reconstruction technique.



FINDINGS:



LOWER THORAX:

Mild bibasilar discoid atelectasis. 



LIVER:

Unremarkable. No gross lesion or ductal dilatation. 



GALLBLADDER AND BILE DUCTS:

Unremarkable. 



PANCREAS:

Mild peripancreatic edema consistent with mild pancreatitis. No 

evidence of necrosis or pseudocyst formation.



SPLEEN:

Unremarkable. 



ADRENALS:

Unremarkable. No mass. 



KIDNEYS AND URETERS:

Unremarkable. No hydronephrosis. No solid mass. 



VASCULATURE:

Unremarkable. No aortic aneurysm. 



BOWEL:

Unremarkable. No obstruction. No gross mural thickening. 



APPENDIX:

Normal appendix. 



PERITONEUM:

Unremarkable. No free fluid. No free air. 



LYMPH NODES:

Unremarkable. No enlarged lymph nodes. 



BLADDER:

Unremarkable. 



REPRODUCTIVE:

Unremarkable. 



BONES:

No acute fracture. 



OTHER FINDINGS:

None.



IMPRESSION:

Mild peripancreatic edema consistent with mild pancreatitis. No 

evidence of necrosis or pseudocyst formation.

## 2018-08-06 ENCOUNTER — HOSPITAL ENCOUNTER (OUTPATIENT)
Dept: HOSPITAL 42 - ED | Age: 63
Setting detail: OBSERVATION
LOS: 2 days | Discharge: HOME | End: 2018-08-08
Attending: INTERNAL MEDICINE | Admitting: INTERNAL MEDICINE
Payer: SELF-PAY

## 2018-08-06 VITALS — BODY MASS INDEX: 17.9 KG/M2

## 2018-08-06 DIAGNOSIS — K86.89: ICD-10-CM

## 2018-08-06 DIAGNOSIS — D64.9: ICD-10-CM

## 2018-08-06 DIAGNOSIS — K86.2: ICD-10-CM

## 2018-08-06 DIAGNOSIS — Z87.11: ICD-10-CM

## 2018-08-06 DIAGNOSIS — Z96.641: ICD-10-CM

## 2018-08-06 DIAGNOSIS — Z82.49: ICD-10-CM

## 2018-08-06 DIAGNOSIS — F17.210: ICD-10-CM

## 2018-08-06 DIAGNOSIS — I47.2: ICD-10-CM

## 2018-08-06 DIAGNOSIS — Z95.0: ICD-10-CM

## 2018-08-06 DIAGNOSIS — Z82.3: ICD-10-CM

## 2018-08-06 DIAGNOSIS — F41.9: ICD-10-CM

## 2018-08-06 DIAGNOSIS — Z95.810: ICD-10-CM

## 2018-08-06 DIAGNOSIS — I10: ICD-10-CM

## 2018-08-06 DIAGNOSIS — K85.90: Primary | ICD-10-CM

## 2018-08-06 DIAGNOSIS — K21.9: ICD-10-CM

## 2018-08-06 LAB
ALBUMIN SERPL-MCNC: 3 G/DL (ref 3–4.8)
ALBUMIN SERPL-MCNC: 3.6 G/DL (ref 3–4.8)
ALBUMIN/GLOB SERPL: 1 {RATIO} (ref 1.1–1.8)
ALBUMIN/GLOB SERPL: 1.1 {RATIO} (ref 1.1–1.8)
ALT SERPL-CCNC: 15 U/L (ref 7–56)
ALT SERPL-CCNC: 27 U/L (ref 7–56)
APPEARANCE UR: (no result)
AST SERPL-CCNC: 17 U/L (ref 14–36)
AST SERPL-CCNC: 22 U/L (ref 14–36)
BACTERIA #/AREA URNS HPF: (no result) /[HPF]
BASOPHILS # BLD AUTO: 0.02 K/MM3 (ref 0–2)
BASOPHILS # BLD AUTO: 0.02 K/MM3 (ref 0–2)
BASOPHILS NFR BLD: 0.2 % (ref 0–3)
BASOPHILS NFR BLD: 0.3 % (ref 0–3)
BILIRUB DIRECT SERPL-MCNC: 0.2 MG/DL (ref 0–0.4)
BILIRUB UR-MCNC: (no result) MG/DL
BUN SERPL-MCNC: 12 MG/DL (ref 7–21)
BUN SERPL-MCNC: 9 MG/DL (ref 7–21)
CALCIUM SERPL-MCNC: 8.5 MG/DL (ref 8.4–10.5)
CALCIUM SERPL-MCNC: 8.8 MG/DL (ref 8.4–10.5)
COLOR UR: YELLOW
EOSINOPHIL # BLD: 0.1 10*3/UL (ref 0–0.7)
EOSINOPHIL # BLD: 0.2 10*3/UL (ref 0–0.7)
EOSINOPHIL NFR BLD: 0.5 % (ref 1.5–5)
EOSINOPHIL NFR BLD: 2 % (ref 1.5–5)
ERYTHROCYTE [DISTWIDTH] IN BLOOD BY AUTOMATED COUNT: 15.1 % (ref 11.5–14.5)
ERYTHROCYTE [DISTWIDTH] IN BLOOD BY AUTOMATED COUNT: 15.3 % (ref 11.5–14.5)
GFR NON-AFRICAN AMERICAN: > 60
GFR NON-AFRICAN AMERICAN: > 60
GLUCOSE UR STRIP-MCNC: NEGATIVE MG/DL
GRANULOCYTES # BLD: 5.79 10*3/UL (ref 1.4–6.5)
GRANULOCYTES # BLD: 7.6 10*3/UL (ref 1.4–6.5)
GRANULOCYTES NFR BLD: 73.1 % (ref 50–68)
GRANULOCYTES NFR BLD: 81.5 % (ref 50–68)
HGB BLD-MCNC: 10.5 G/DL (ref 12–16)
HGB BLD-MCNC: 11.1 G/DL (ref 12–16)
LEUKOCYTE ESTERASE UR-ACNC: (no result) LEU/UL
LIPASE SERPL-CCNC: 823 U/L (ref 23–300)
LYMPHOCYTES # BLD: 1 10*3/UL (ref 1.2–3.4)
LYMPHOCYTES # BLD: 1.4 10*3/UL (ref 1.2–3.4)
LYMPHOCYTES NFR BLD AUTO: 10.8 % (ref 22–35)
LYMPHOCYTES NFR BLD AUTO: 17.2 % (ref 22–35)
MCH RBC QN AUTO: 33 PG (ref 25–35)
MCH RBC QN AUTO: 33.9 PG (ref 25–35)
MCHC RBC AUTO-ENTMCNC: 34.3 G/DL (ref 31–37)
MCHC RBC AUTO-ENTMCNC: 35.5 G/DL (ref 31–37)
MCV RBC AUTO: 95.5 FL (ref 80–105)
MCV RBC AUTO: 96.4 FL (ref 80–105)
MONOCYTES # BLD AUTO: 0.6 10*3/UL (ref 0.1–0.6)
MONOCYTES # BLD AUTO: 0.7 10*3/UL (ref 0.1–0.6)
MONOCYTES NFR BLD: 7 % (ref 1–6)
MONOCYTES NFR BLD: 7.4 % (ref 1–6)
PH UR STRIP: 6.5 [PH] (ref 4.7–8)
PLATELET # BLD: 295 10^3/UL (ref 120–450)
PLATELET # BLD: 322 10^3/UL (ref 120–450)
PMV BLD AUTO: 10.2 FL (ref 7–11)
PMV BLD AUTO: 10.5 FL (ref 7–11)
PROT UR STRIP-MCNC: 100 MG/DL
RBC # BLD AUTO: 3.1 10^6/UL (ref 3.5–6.1)
RBC # BLD AUTO: 3.36 10^6/UL (ref 3.5–6.1)
RBC # UR STRIP: NEGATIVE /UL
RBC #/AREA URNS HPF: NEGATIVE /HPF (ref 0–2)
SP GR UR STRIP: 1.02 (ref 1–1.03)
UROBILINOGEN UR STRIP-ACNC: 0.2 E.U./DL
WBC # BLD AUTO: 7.9 10^3/UL (ref 4.5–11)
WBC # BLD AUTO: 9.3 10^3/UL (ref 4.5–11)

## 2018-08-06 PROCEDURE — 99284 EMERGENCY DEPT VISIT MOD MDM: CPT

## 2018-08-06 PROCEDURE — 83690 ASSAY OF LIPASE: CPT

## 2018-08-06 PROCEDURE — 36415 COLL VENOUS BLD VENIPUNCTURE: CPT

## 2018-08-06 PROCEDURE — 74177 CT ABD & PELVIS W/CONTRAST: CPT

## 2018-08-06 PROCEDURE — 87086 URINE CULTURE/COLONY COUNT: CPT

## 2018-08-06 PROCEDURE — 81001 URINALYSIS AUTO W/SCOPE: CPT

## 2018-08-06 PROCEDURE — 93005 ELECTROCARDIOGRAM TRACING: CPT

## 2018-08-06 PROCEDURE — 93970 EXTREMITY STUDY: CPT

## 2018-08-06 PROCEDURE — 80053 COMPREHEN METABOLIC PANEL: CPT

## 2018-08-06 PROCEDURE — 84100 ASSAY OF PHOSPHORUS: CPT

## 2018-08-06 PROCEDURE — 71260 CT THORAX DX C+: CPT

## 2018-08-06 PROCEDURE — 84132 ASSAY OF SERUM POTASSIUM: CPT

## 2018-08-06 PROCEDURE — 83735 ASSAY OF MAGNESIUM: CPT

## 2018-08-06 PROCEDURE — 87181 SC STD AGAR DILUTION PER AGT: CPT

## 2018-08-06 PROCEDURE — 85025 COMPLETE CBC W/AUTO DIFF WBC: CPT

## 2018-08-06 NOTE — CP.PCM.HP
<Elias Abdalla - Last Filed: 18 20:08>





History of Present Illness





- History of Present Illness


History of Present Illness: 


63 year old female with a past medical history of pancreatitis in 2017, gastric 

ulcers, ventricular tachycardia s/p pacemaker insertion, hypertension, and GERD 

presents with abdominal pain for 2-3 months and 20 pound weight loss in 2 

months. Patient reports the pain is stabbing, aching abdominal pain that 

fluctuates from the left side of the abdomen to the right side of the abdomen. 

The pain is better when sitting up and worse when she lays on her back. Pain is 

unaffected by consumption of food. She denies radiation of pain to back or 

chest. Patient reports loss of appetite for one month. One month ago, patient 

visited her PCP, Dr. Hernandez, for abdominal pain and was prescribed 

prilosec. Patient reports pain did not improve and today, she saw Dr. Jodi Anne again for the same symptoms, who advised her to go to the emergency 

department. Patient also reports intermittent burning sensation of the chest 

and has been diagnosed with GERD. Patient reports 2 normal bowel movements per 

day and has not had an issue with bowel movements. Patient also reports having 

2 endoscopies in  and  showing multiple gastric ulcers. Patient denies 

chest pain, heart palpitations, shortness of breath, nausea, vomiting, 

constipation, diarrhea, dysuria, and hematuria.





PMD: Dr. Hernandez


Cardiologist: Dr. Cruz


Gastroenterologist: Dr. Lakhani


Pharmacy: [facility]


Past medical history: pancreatitis, gastric ulcers, ventricular tachycardia, 

hypertension, GERD


Past surgical history: hip replacement, pacemaker placement, laminectomy


Family history: Mother  from issues regarding the kidney. Father 

 from coronary artery disease. All grandparents  from strokes. 

Sister  from pneumonia.


Social history: 1 pack a day for at least 40 years. 2-3 drinks for at least 40 

years. Denies any recreational drug use.











Present on Admission





- Present on Admission


Any Indicators Present on Admission: No


History of DVT/PE: No


History of Uncontrolled Diabetes: No


Urinary Catheter: No





Review of Systems





- EENT


Eyes: absent: Blurred Vision, Decreased Night Vision, Diplopia


Nose/Mouth/Throat: absent: Epistaxis, Dysphagia, Halitosis





- Cardiovascular


Cardiovascular: As Per HPI, Chest Pain (intermittent).  absent: Diaphoresis, 

Dyspnea, Irregular Heart Rhythm





- Respiratory


Respiratory: absent: Cough, Dyspnea





- Gastrointestinal


Gastrointestinal: Abdominal Pain (epigastric, fluctuating from left to right), 

Bloating, Heartburn.  absent: Coffee Ground Emesis, Constipation, Diarrhea, 

Hematemesis, Hematochezia





- Musculoskeletal


Musculoskeletal: As Per HPI.  absent: Abnormal Gait, Arthralgias





- Neurological


Neurological: absent: Numbness, Tingling





- Endocrine


Endocrine: Change in Body Appearance (20 lb weight loss in 2 months)





Past Patient History





- Infectious Disease


Hx of Infectious Diseases: None





- Tetanus Immunizations


Tetanus Immunization: Unknown





- Past Social History


Smoking Status: Current Some Days Smoker


Alcohol: Other (2-3 drinks a few times a week)


Drugs: Denies





- CARDIAC


Hx Cardiac Disorders: Yes


Hx Hypertension: Yes


Hx Pacemaker: Yes





- PULMONARY


Hx Respiratory Disorders: Yes (SMOKES 1/2 A PACK TO 1 PPD.)





- NEUROLOGICAL


Hx Neurological Disorder: No





- HEENT


Hx HEENT Problems: Yes (wears glasses)





- RENAL


Hx Chronic Kidney Disease: No





- ENDOCRINE/METABOLIC


Hx Endocrine Disorders: No





- HEMATOLOGICAL/ONCOLOGICAL


Hx Blood Disorders: No





- INTEGUMENTARY


Hx Dermatological Problems: Yes


Other/Comment: HEALED SKIN ABRASION TO RIGHT BACK OF LEG.





- MUSCULOSKELETAL/RHEUMATOLOGICAL


Hx Musculoskeletal Disorders: Yes


Hx Arthritis: Yes ("all over")


Hx Fractures: Yes (L wrist casted, R ft metatarsal casted)


Hx Osteoarthritis: Yes ("all over")





- GASTROINTESTINAL


Hx Gastrointestinal Disorders: Yes


Hx Gastroesophageal Reflux: Yes


Other/Comment: hemorrhoids a "long time ago"





- GENITOURINARY/GYNECOLOGICAL


Hx Genitourinary Disorders: No





- PSYCHIATRIC


Hx Psychophysiologic Disorder: Yes (ETOH -DAILY WINE,SMOKES CIGARETTES 1/2-1 

PPD.)


Hx Anxiety: Yes


Hx Substance Use: No





- SURGICAL HISTORY


Hx Cardiac Catheterization: Yes ( neg)


Hx Orthopedic Surgery: Yes (herniated disc c3 and 4,right hip replacement)


Other/Comment: R hip replacement





- ANESTHESIA


Hx Anesthesia: No


Hx Anesthesia Reactions: No


Hx Malignant Hyperthermia: No





Meds


Allergies/Adverse Reactions: 


 Allergies











Allergy/AdvReac Type Severity Reaction Status Date / Time


 


Penicillins Allergy  RASH Verified 18 13:46














Physical Exam





- Constitutional


Appears: Well, Non-toxic





- Head Exam


Head Exam: ATRAUMATIC, NORMOCEPHALIC





- Eye Exam


Eye Exam: EOMI


Pupil Exam: PERRL





- ENT Exam


ENT Exam: Mucous Membranes Moist





- Respiratory Exam


Respiratory Exam: Clear to Auscultation Bilateral, NORMAL BREATHING PATTERN





- Cardiovascular Exam


Cardiovascular Exam: REGULAR RHYTHM, RRR


Additional comments: 


ICD in left side of chest








- GI/Abdominal Exam


GI & Abdominal Exam: Firm, Mass, Normal Bowel Sounds, Tenderness


Additional comments: 


epigastric tenderness, mass








- Extremities Exam


Extremities exam: Positive for: full ROM, normal inspection





- Back Exam


Back exam: NORMAL INSPECTION





- Neurological Exam


Neurological exam: Alert, CN II-XII Intact, Oriented x3





- Skin


Skin Exam: Dry, Intact, Normal Color





Results





- Vital Signs


Recent Vital Signs: 





 Last Vital Signs











Temp  98.1 F   18 13:47


 


Pulse  96 H  18 13:47


 


Resp  18   18 13:47


 


BP  156/52 H  18 13:47


 


Pulse Ox  98   18 13:47














- Labs


Result Diagrams: 


 18 15:05





 18 15:05





Assessment & Plan





- Assessment and Plan (Free Text)


Assessment: 


63 year old female with a past medical history of pancreatitis in 2017, gastric 

ulcers, ventricular tachycardia s/p pacemaker insertion, hypertension, and GERD 

presents with abdominal pain for 2-3 months and 20 pound weight loss in 2 

months.





Plan: 


Suspected Pancreatic Mass 2/2 to smoking and alcohol use


-Patient has history of pancreatitis in 2017


-Patient has 40+ pack year history and has 2-3 drinks few times a week for 40+ 

years


-Chest/Abd/Pelvic CT: ill-defined pancreatic and peripancreatic inflammatory 

changes of acute pancreatitis, inflammatory/infiltrative changes extend from 

the pancreas to the lesser curve. Indirect findings of pancreatic neoplasm 

including splenic vein thrombosis, dilatation of the pancreatic duct, multiple 

cystic masses in the pancreas, cystic lesions in the pancreas not previously 

seen and therefore suspicious for metastatic disease. These are not typical for 

embolic phenomenon/infarcts.


-Lipase: 823


-IV fluids NS 0.9% 1 L


-Morphine 2 mg Q4PRN for pain


-Zofran 4 mg Q8PRN for pain


-clear liquid diet


-Dr. Lakhani consulted for GI. Possible EUS with biopsy tomorrow to confirm 

suspected pancreatic mass. If patient is to have procedure, patient should be 

placed NPO.





Hypokalemia


-Potassium: 3.3. Baseline is 3.2-3.9.


-Continue to monitor.


-Potassium chloride 20 mEq in NS 0.9% 1 L started





Normocytic anemia


-Hgb: 11.1. Baseline is 11.5-12.7 in 2017.


-Continue to monitor. 


-Patient has history of peptic ulcer disease. If hemoglobin continues to 

downtrend, consider endoscopy.





Hx of ventricular tachycardia


-continue home amiodarone and metoprolol





Hx of hypertension


-continue home metoprolol





Hx of Anxiety


-alprazolam 0.25 mg PO TID PRN





GI prophylaxis: protonix 40 mg daily


DVT prophylaxis: lovenox 40 mg daily











- Date & Time


Date: 18


Time: 19:42





Decision To Admit





- Pt Status Changed To:


Hospital Disposition Of: Inpatient Admission





- Admit Certification


Admit to Inpatient:: After my assessment, the patient will require 

hospitalization for at least two midnights.  This is because of the severity of 

symptoms shown, intensity of services needed, and/or the medical risk in this 

patient being treated as an outpatient.





- .


Bed Request Type: Med/Surg


Admitting Physician: Eben Temple





<Eben Temple - Last Filed: 18 07:52>





Results





- Vital Signs


Recent Vital Signs: 





 Last Vital Signs











Temp  98.2 F   18 17:06


 


Pulse  66   18 17:06


 


Resp  20   18 17:06


 


BP  100/63   18 17:06


 


Pulse Ox  97   18 17:06














- Labs


Result Diagrams: 


 18 06:05





 18 06:05


Labs: 





 Laboratory Results - last 24 hr











  18





  07:47 07:47 10:30


 


WBC  7.0  


 


RBC  3.04 L  


 


Hgb  10.0 L  


 


Hct  29.2 L  


 


MCV  96.1  


 


MCH  32.9  


 


MCHC  34.2  


 


RDW  15.4 H  


 


Plt Count  287  


 


MPV  10.0  


 


Gran %  64.1  


 


Lymph % (Auto)  24.1  


 


Mono % (Auto)  8.2 H  


 


Eos % (Auto)  3.2  


 


Baso % (Auto)  0.4  


 


Gran #  4.46  


 


Lymph # (Auto)  1.7  


 


Mono # (Auto)  0.6  


 


Eos # (Auto)  0.2  


 


Baso # (Auto)  0.03  


 


Sodium   139 


 


Potassium   3.8  3.7


 


Chloride   102 


 


Carbon Dioxide   29 


 


Anion Gap   12 


 


BUN   7 


 


Creatinine   0.6 L 


 


Est GFR ( Amer)   > 60 


 


Est GFR (Non-Af Amer)   > 60 


 


Random Glucose   89 


 


Calcium   8.5 


 


Phosphorus   2.8 


 


Magnesium   1.9 


 


Total Bilirubin   0.4 


 


AST   17 


 


ALT   15 


 


Alkaline Phosphatase   100 


 


Total Protein   5.9 


 


Albumin   3.0 


 


Globulin   2.9 


 


Albumin/Globulin Ratio   1.0 L 














  18





  06:05 06:05


 


WBC  6.8 


 


RBC  2.88 L 


 


Hgb  9.6 L 


 


Hct  28.3 L 


 


MCV  98.3 


 


MCH  33.3 


 


MCHC  33.9 


 


RDW  15.9 H 


 


Plt Count  304 


 


MPV  10.2 


 


Gran %  65.9 


 


Lymph % (Auto)  20.7 L 


 


Mono % (Auto)  9.9 H 


 


Eos % (Auto)  3.1 


 


Baso % (Auto)  0.4 


 


Gran #  4.46 


 


Lymph # (Auto)  1.4 


 


Mono # (Auto)  0.7 H 


 


Eos # (Auto)  0.2 


 


Baso # (Auto)  0.03 


 


Sodium   140


 


Potassium   4.6


 


Chloride   105


 


Carbon Dioxide   30


 


Anion Gap   10


 


BUN   6 L


 


Creatinine   0.6 L


 


Est GFR ( Amer)   > 60


 


Est GFR (Non-Af Amer)   > 60


 


Random Glucose   98


 


Calcium   8.6


 


Phosphorus   2.9


 


Magnesium   1.9


 


Total Bilirubin   0.5


 


AST   16


 


ALT   22


 


Alkaline Phosphatase   90


 


Total Protein   5.7 L


 


Albumin   2.8 L


 


Globulin   2.9


 


Albumin/Globulin Ratio   0.9 L














Attending/Attestation





- Attestation


I have personally seen and examined this patient.: Yes


I have fully participated in the care of the patient.: Yes


I have reviewed all pertinent clinical information: Yes


Notes (Text): 





18 07:49





Attending note;





Patient seen and examined with resident in ER.


Patient's  by the bedside.





Patient is a 63 year old female with a past medical history of pancreatitis in 

2017, gastric ulcers, ventricular tachycardia s/p pacemaker insertion, 

hypertension, active smoking , history of alcohol abuse and GERD presents with 

abdominal pain for 2-3 months and 20 pound weight loss in 2 months.


CT abdomen and pelvis showed ill-defined pancreatic and peripancreatic 

inflammation with a suspicion of pancreatitis with pseudocyst versus pancreatic 

mass.


GI evaluation requested.





Nothing by mouth. Started on IV fluids.


IV morphine for pain management.





History of arrhythmias; status post pacemaker. Stable cardiac status.





History of smoking; smoking cessation is strongly advised.


History of alcohol abuse; patient stop alcohol use few months ago.





Monitor closely.





Upon discharge patient will follow up with PMD .

## 2018-08-06 NOTE — CT
Date of service: 



08/06/2018



PROCEDURE:  CT Chest, Abdomen and Pelvis with intravenous contrast



HISTORY:

Abdominal pain and weight loss. 



COMPARISON:

05/31/2017 CT abdomen documenting acute pancreatitis. 



05/29/2017 abdominal ultrasound 



TECHNIQUE:

IV dose administered:  100 cc Omnipaque 350 



Radiation dose:



Total exam DLP = 387.47 mGy-cm.



This CT exam was performed using one or more of the following dose 

reduction techniques: Automated exposure control, adjustment of the 

mA and/or kV according to patient size, and/or use of iterative 

reconstruction technique.



FINDINGS:



CT CHEST WITH CONTRAST:



LUNGS:

Clear. No nodule, mass or consolidation.



MEDIASTINUM:

Unremarkable. Normal caliber aorta and pulmonary arterial trunk. No 

aortic dissection. Normal size heart.



LYMPH NODES:

Unremarkable.



PLEURA:

Unremarkable. No pneumothorax. No pleural fluid.



BONES:

Unremarkable.



OTHER FINDINGS:

None.



CT ABDOMEN AND PELVIS:



LIVER:

Hepatic steatosis, focal fatty sparing of the liver.  No suspicious 

hepatic masses identified. 



GALLBLADDER AND BILE DUCTS:

Unremarkable. 



PANCREAS:

N although nonspecific, metastatic disease should be considered. 



However are there are suspicious findings pancreatic neoplasm.  

Specifically the pancreatic duct is dilated.



The splenic vein is occluded/thrombosed.



There are multiple cystic masses in the body and tail of the pancreas.



There are either a inflammatory or neoplastic changes extending from 

the pancreas to the lesser curvature of stomach.



SPLEEN:

Ms. Rossi is a normal size and contour but there are multiple cystic 

areas within the 



ADRENALS:

Unremarkable. No mass. 



KIDNEYS AND URETERS:

Unremarkable. No hydronephrosis. No solid mass. 



VASCULATURE:

Unremarkable. No aortic aneurysm. 



BOWEL:

Diverticulosis without an acute inflammatory component or other 

associated pathologic process. 



APPENDIX:

Normal appendix. 



PERITONEUM:

Unremarkable. No free fluid. No free air. 



LYMPH NODES:

Peripancreatic/retroperitoneal lymph nodes identified adjacent to the 

gastroduodenal artery. 



BLADDER:

Unremarkable. 



REPRODUCTIVE:

Remarkable uterus is visualized.  Cystic right adnexal mass 2.3 x 2.8 

cm.  Cystic mass left adnexa 1.5 x 1.7 cm. 



BONES:

No acute fracture. 



OTHER FINDINGS:

None.



IMPRESSION:

1. Ill-defined pancreatic and peripancreatic inflammatory changes of 

acute pancreatitis. 



2. Inflammatory/infiltrative changes extend from the pancreas to the 

lesser curve. Whether this is the sequela of an acute inflammatory 

process or desmoplastic infiltration by tumor cannot be determined. 



3. Indirect findings of pancreatic neoplasm including splenic vein 

thrombosis, dilatation of the pancreatic duct.



4. There multiple cystic masses in the pancreas.  Differentiating 

cystic neoplasm from pseudocyst is difficult.



5. Cystic lesions in the pancreas not seen previously and therefore 

suspicious for metastatic disease. These are not typical for embolic 

phenomenon/infarcts. 



Communication of results: I discussed these findings directly with 

the attending physician in the emergency department.

## 2018-08-06 NOTE — ED PDOC
Arrival/HPI





- General


Chief Complaint: Abdominal Pain


Time Seen by Provider: 08/06/18 13:42





- History of Present Illness


Narrative History of Present Illness (Text): 





08/06/18 16:52


Ms. Moss is a pleasant 63 year old female with PMHx pancreatitis (admitted [facility] 

5/29/2017), gastric ulcers, R hip replacement, pacemaker placement for 

persistent V tach who presents with epigastric pain and weight loss.  Pt first 

began experiencing abdominal pain 2 months ago but states the pain has become 

unbearable over the past 2-3 weeks.  She has had difficulty eating due to loss 

of appetite as well as abdominal pain and substernal pressure a/w eating solids 

or liquids.  Pt describes 20 lb unintentional weight loss over the course of 

the past two months.  Pt went to see Dr. Myles in the office this morning 

for worsening of her abdominal pain, and Dr. Guzmán sent the patient to the 

emergency department.  Pt describes the pain as 8/10, sharp, and diffuse.  Her 

last meal was half a cup of clear broth last evening as that was all she could 

tolerate.  She denies any vomiting, diarrhea, constipation, blood in the stool 

or dark tarry stools.  Her last BM was yesterday and was normal and non-bloody.

  She also complains of having a lot of gas in her abdomen and has been passing 

gas frequently.  Pt is a smoker with many years history of smoking 1/2-1 pack/

day, drinks 1 mixed drink per night but has not had alcohol in 2 months since 

her abdominal symptoms.








Past Medical History





- Provider Review


Nursing Documentation Reviewed: Yes





- Infectious Disease


Hx of Infectious Diseases: None





- Tetanus Immunization


Tetanus Immunization: Unknown





- Cardiac


Hx Cardiac Disorders: Yes


Hx Hypertension: Yes


Hx Pacemaker: Yes





- Pulmonary


Hx Respiratory Disorders: Yes (SMOKES 1/2 A PACK TO 1 PPD.)





- Neurological


Hx Neurological Disorder: No





- HEENT


Hx HEENT Disorder: Yes (wears glasses)





- Renal


Hx Renal Disorder: No





- Endocrine/Metabolic


Hx Endocrine Disorders: No





- Hematological/Oncological


Hx Blood Disorders: No





- Integumentary


Hx Dermatological Disorder: Yes


Other/Comment: HEALED SKIN ABRASION TO RIGHT BACK OF LEG.





- Musculoskeletal/Rheumatological


Hx Musculoskeletal Disorders: Yes


Hx Arthritis: Yes ("all over")


Hx Fractures: Yes (L wrist casted, R ft metatarsal casted)


Hx Osteoarthritis: Yes ("all over")





- Gastrointestinal


Hx Gastrointestinal Disorders: Yes


Hx Gastroesophageal Reflux: Yes


Other/Comment: hemorrhoids a "long time ago"





- Genitourinary/Gynecological


Hx Genitourinary Disorders: No





- Psychiatric


Hx Psychophysiologic Disorder: Yes (ETOH -DAILY WINE,SMOKES CIGARETTES 1/2-1 

PPD.)


Hx Anxiety: Yes


Hx Substance Use: No





- Past Surgical History


Past Surgical History: Unable to Obtain





- Surgical History


Hx Cardiac Catheterization: Yes (2002 neg)


Hx Orthopedic Surgery: Yes (herniated disc c3 and 4,right hip replacement)


Other/Comment: R hip replacement





- Anesthesia


Hx Anesthesia: No


Hx Anesthesia Reactions: No


Hx Malignant Hyperthermia: No





- Suicidal Assessment


Feels Threatened In Home Enviroment: No





Family/Social History


Family/Social History: CVA/TIA (Father - CVA)


Smoking Status: Current Some Days Smoker


Hx Alcohol Use: No


Hx Substance Use: No


Hx Substance Use Treatment: No





Allergies/Home Meds


Allergies/Adverse Reactions: 


Allergies





Penicillins Allergy (Verified 08/06/18 13:46)


 RASH








Home Medications: 


 Home Meds











 Medication  Instructions  Recorded  Confirmed


 


Valsartan/Hydrochlorothiazide 1 tab PO DAILY 11/04/14 08/06/18





[Valsartan-Hctz 160-12.5 mg Tab]   


 


Amiodarone HCl [Pacerone] 100 mg PO DAILY 05/29/17 08/06/18


 


Metoprolol Succinate XL [Toprol XL] 100 mg PO DAILY 05/29/17 08/06/18














Review of Systems





- Review of Systems


Constitutional: Weight Change (+20 lb weight loss).  absent: Fevers


Eyes: absent: Photophobia, Eye Pain


ENT: absent: Sore Throat, Rhinorrhea


Respiratory: absent: SOB, Sputum, Wheezing


Cardiovascular: absent: Chest Pain, Edema


Gastrointestinal: Abdominal Pain, Nausea, Appetite Changes, Anorexia, Food 

Intolerance.  absent: Stool Changes, Constipation, Diarrhea, Vomiting, 

Hematochezia, Hematemesis


Genitourinary Female: absent: Dysuria, Frequency


Musculoskeletal: Normal.  absent: Arthralgias, Back Pain


Skin: Normal.  absent: Rash, Pruritis


Neurological: Normal.  absent: Headache, Dizziness, Facial Droop


Endocrine: Normal.  absent: Polyuria, Polydipsia





Physical Exam


Vital Signs Reviewed: Yes


Vital Signs











  Temp Pulse Resp BP Pulse Ox


 


 08/06/18 19:52  98.2 F  78  16  128/76  100


 


 08/06/18 13:47  98.1 F  96 H  18  156/52 H  98











Temperature: Afebrile


Blood Pressure: Hypertensive


Pulse: Regular


Respiratory Rate: Normal


Appearance: Positive for: Uncomfortable, Other (Thin appearing)


Pain Distress: Moderate


Mental Status: Positive for: Alert and Oriented X 3.  No: Confused, Agitated





- Systems Exam


Head: Present: Atraumatic, Normocephalic


Pupils: Present: PERRL


Extroacular Muscles: Present: EOMI


Conjunctiva: Present: Normal


Mouth: Present: Moist Mucous Membranes


Pharnyx: Present: Normal.  No: ERYTHEMA, EXUDATE, TONSILS ENLARGED, Uvular 

Deviation


Nose (External): Present: Atraumatic


Neck: Present: Normal Range of Motion.  No: JVD


Respiratory/Chest: Present: Clear to Auscultation, Good Air Exchange.  No: 

Respiratory Distress, Accessory Muscle Use, Wheezes


Cardiovascular: Present: Regular Rate and Rhythm, Normal S1, S2, Other (+

Implanted Pacemaker).  No: Murmurs


Abdomen: Present: Tenderness, Normal Bowel Sounds.  No: Distention, Peritoneal 

Signs, Rebound, Guarding, McBurney's Point Tender


Upper Extremity: Present: Normal Inspection, NORMAL PULSES.  No: Cyanosis, Edema


Lower Extremity: Present: Normal Inspection, NORMAL PULSES.  No: Edema


Neurological: Present: GCS=15, CN II-XII Intact, Speech Normal


Skin: Present: Warm, Dry, Normal Color.  No: Rashes


Psychiatric: Present: Alert, Oriented x 3, Normal Insight





Medical Decision Making


ED Course and Treatment: 


Epigastric Pain - DDx: Carcinoma (pancreatic, gastric, mets) vs pancreatitis vs 

gastric ulcers vs gastritis





08/06/18 17:54


* CT Chest/Abd Pelvis w/ IV/PO contrast


 * Per radiology - Evidence of pancreatitis, possible pancreatic mass.  Will 

obtain MRI.


* CBC, CMP, liver panels, lipase, urine


 * Lipase - 823








- Lab Interpretations


Lab Results: 








 08/06/18 15:05 





 08/06/18 15:05 





 Lab Results





08/06/18 15:50: Urine Color Yellow, Urine Appearance Turbid, Urine pH 6.5, Ur 

Specific Gravity 1.025, Urine Protein 100 H, Urine Glucose (UA) Negative, Urine 

Ketones 15 H, Urine Blood Negative, Urine Nitrate Negative, Urine Bilirubin 

Moderate H, Urine Urobilinogen 0.2, Ur Leukocyte Esterase Trace H, Urine RBC 

Negative, Urine WBC 5 - 10, Ur Epithelial Cells 3 - 4, Urine Bacteria Trace, 

Hyaline Casts 2 - 5


08/06/18 15:05: WBC 9.3  D, RBC 3.36 L, Hgb 11.1 L, Hct 32.4 L, MCV 96.4, MCH 

33.0, MCHC 34.3, RDW 15.1 H, Plt Count 322, MPV 10.5, Gran % 81.5 H, Lymph % (

Auto) 10.8 L, Mono % (Auto) 7.0 H, Eos % (Auto) 0.5 L, Baso % (Auto) 0.2, Gran 

# 7.60 H, Lymph # (Auto) 1.0 L, Mono # (Auto) 0.7 H, Eos # (Auto) 0.1, Baso # (

Auto) 0.02


08/06/18 15:05: Sodium 139, Potassium 3.3 L, Chloride 99, Carbon Dioxide 27, 

Anion Gap 17, BUN 12, Creatinine 0.7, Est GFR (African Amer) > 60, Est GFR (Non-

Af Amer) > 60, Random Glucose 90, Calcium 8.8, Total Bilirubin 0.7, Direct 

Bilirubin 0.2, AST 22, ALT 27, Alkaline Phosphatase 114, Total Protein 7.1, 

Albumin 3.6, Globulin 3.5, Albumin/Globulin Ratio 1.1, Lipase 823 H











- RAD Interpretation


Radiology Orders: 








08/06/18 14:28


CHEST,ABD,PEL W/IV&PO CONTRAST [CT] Stat 














- EKG Interpretation


EKG Interpretation (Text): 





08/06/18 20:07


NSR


Septal infarct, age undetermined


T wave inversions V1-V3


QT prolonged (QTc 513)


Interpreted by ED Physician: Yes


Type: 12 lead EKG





- Medication Orders


Current Medication Orders: 








Alprazolam (Xanax)  0.25 mg PO TID PRN; Protocol


   PRN Reason: Anxiety


   Stop: 08/14/18 10:01


Enoxaparin Sodium (Lovenox)  40 mg SC DAILY [facility]


   PRN Reason: Protocol


Amiodarone HCl 100 mg/ (Dextrose)  102 mls @ 60 mls/hr IV DAILY [facility]


Sodium Chloride (Sodium Chloride 0.9%)  1,000 mls @ 999 mls/hr IV .Q1H1M STA


   Stop: 08/06/18 20:48


Potassium Chloride 20 meq/ (Sodium Chloride)  1,010 mls @ 0 mls/hr IV .Q0M [facility]


   PRN Reason: Per Protocol


Metoprolol Succinate (Toprol Xl)  100 mg PO BRK [facility]


Morphine Sulfate (Morphine)  2 mg IVP Q4 PRN


   PRN Reason: Pain, moderate (4-7)


Ondansetron HCl (Zofran Inj)  4 mg IVP Q8 PRN


   PRN Reason: Nausea/Vomiting


Pantoprazole Sodium (Protonix Inj)  40 mg IVP DAILY [facility]











Disposition/Present on Arrival





- Present on Arrival


History of DVT/PE: No


History of Uncontrolled Diabetes: No


Urinary Catheter: No


History of Decub. Ulcer: No


History Surgical Site Infection Following: None





- Disposition

## 2018-08-07 VITALS — RESPIRATION RATE: 20 BRPM

## 2018-08-07 LAB
ALBUMIN SERPL-MCNC: 3 G/DL (ref 3–4.8)
ALBUMIN/GLOB SERPL: 1 {RATIO} (ref 1.1–1.8)
ALT SERPL-CCNC: 15 U/L (ref 7–56)
AST SERPL-CCNC: 17 U/L (ref 14–36)
BASOPHILS # BLD AUTO: 0.03 K/MM3 (ref 0–2)
BASOPHILS NFR BLD: 0.4 % (ref 0–3)
BUN SERPL-MCNC: 7 MG/DL (ref 7–21)
CALCIUM SERPL-MCNC: 8.5 MG/DL (ref 8.4–10.5)
EOSINOPHIL # BLD: 0.2 10*3/UL (ref 0–0.7)
EOSINOPHIL NFR BLD: 3.2 % (ref 1.5–5)
ERYTHROCYTE [DISTWIDTH] IN BLOOD BY AUTOMATED COUNT: 15.4 % (ref 11.5–14.5)
GFR NON-AFRICAN AMERICAN: > 60
GRANULOCYTES # BLD: 4.46 10*3/UL (ref 1.4–6.5)
GRANULOCYTES NFR BLD: 64.1 % (ref 50–68)
HGB BLD-MCNC: 10 G/DL (ref 12–16)
LYMPHOCYTES # BLD: 1.7 10*3/UL (ref 1.2–3.4)
LYMPHOCYTES NFR BLD AUTO: 24.1 % (ref 22–35)
MCH RBC QN AUTO: 32.9 PG (ref 25–35)
MCHC RBC AUTO-ENTMCNC: 34.2 G/DL (ref 31–37)
MCV RBC AUTO: 96.1 FL (ref 80–105)
MONOCYTES # BLD AUTO: 0.6 10*3/UL (ref 0.1–0.6)
MONOCYTES NFR BLD: 8.2 % (ref 1–6)
PLATELET # BLD: 287 10^3/UL (ref 120–450)
PMV BLD AUTO: 10 FL (ref 7–11)
RBC # BLD AUTO: 3.04 10^6/UL (ref 3.5–6.1)
WBC # BLD AUTO: 7 10^3/UL (ref 4.5–11)

## 2018-08-07 RX ADMIN — MORPHINE SULFATE PRN MG: 2 INJECTION, SOLUTION INTRAMUSCULAR; INTRAVENOUS at 04:23

## 2018-08-07 RX ADMIN — MORPHINE SULFATE PRN MG: 2 INJECTION, SOLUTION INTRAMUSCULAR; INTRAVENOUS at 12:47

## 2018-08-07 RX ADMIN — ENOXAPARIN SODIUM SCH MG: 40 INJECTION SUBCUTANEOUS at 10:22

## 2018-08-07 RX ADMIN — MORPHINE SULFATE PRN MG: 2 INJECTION, SOLUTION INTRAMUSCULAR; INTRAVENOUS at 23:50

## 2018-08-07 RX ADMIN — METOPROLOL SUCCINATE SCH MG: 100 TABLET, EXTENDED RELEASE ORAL at 08:36

## 2018-08-07 RX ADMIN — MORPHINE SULFATE PRN MG: 2 INJECTION, SOLUTION INTRAMUSCULAR; INTRAVENOUS at 08:52

## 2018-08-07 RX ADMIN — MORPHINE SULFATE PRN MG: 2 INJECTION, SOLUTION INTRAMUSCULAR; INTRAVENOUS at 17:30

## 2018-08-07 NOTE — CON
Copied To:  Alexey Lakhani MD

Attending MD:  Alexey Lakhani MD



DATE:  08/07/2018



REQUESTING PHYSICIAN:  Dr. Temple.



REASON FOR CONSULTATION:  I been asked to see this 63-year-old female with

multiple comorbidities including history of ventricular tachycardia, status

post permanent pacemaker placement several years ago, hypertension,

gastroesophageal reflux disease, acute pancreatitis diagnosed back in

05/2017, history of chronic alcohol use who comes to the hospital with

2-month history of increasing mid abdominal pain.  The patient has also

lost about 20 pounds in the last 2 months.  She denies any nausea,

vomiting, hematemesis, melena, rectal bleeding.  The patient states the

pain is mostly continuous; there are only several hours throughout the day

when she does not have any pain.  There is no worsening of pain with food. 

The pain radiates from the left side of the abdomen across the middle of

her abdomen to the right side.  She has a history of gastric ulcers back in

1992.  CT scan of the abdomen and pelvis performed in the emergency room

reveals multiple cystic lesions in the body of the pancreas with a dilated

pancreatic duct and some peripancreatic inflammation.  She currently is

hungry and asking for food.  She had clear liquid breakfast this morning.



PAST MEDICAL HISTORY:  Again is notable for ventricular tachycardia, status

post permanent pacemaker, AICD placement several years ago, pancreatitis,

peptic ulcer disease, hypertension, gastroesophageal reflux disease.



PAST SURGICAL HISTORY:  Notable for permanent pacemaker/AICD placement,

laminectomy, hip replacement.



SOCIAL HISTORY:  The patient smokes up to a pack of cigarettes per day. 

She consumes three shots of gin or glasses of wine daily.  She states that

she has not had a drink since her abdominal pain began several months ago. 

She denies any recreational drug use.



FAMILY HISTORY:  Notable for father with coronary artery disease.



REVIEW OF SYSTEMS:  A 14-point review of systems is positive for abdominal

pain and weight loss.



MEDICATIONS AT HOME:  Include amiodarone, metoprolol, and

valsartan/hydrochlorothiazide.



PHYSICAL EXAMINATION:

GENERAL:  Thin female, lying in bed, in no distress.

VITAL SIGNS:  Reveal temperature of 98.3, blood pressure 100/64, heart rate

62.

HEENT:  Reveal sclerae to be white.  Conjunctivae pink.

NECK:  Supple.

CHEST:  Reveal lungs to be clear.

HEART:  Reveals a regular rate and rhythm.

ABDOMEN:  Soft.  Mild diffuse tenderness.  There is no rebound.  There is

some mild voluntary guarding.

EXTREMITIES:  Show no edema.



LABORATORY DATA:  Reveal white blood cell count of 7, hemoglobin of 10. 

Chemistries reveal normal electrolytes.  Yesterday her potassium was down

to 2.4, this was repleted.  Hr lipase yesterday was 823.



IMPRESSION:  This is a 63-year-old female, with several months of worsening

abdominal pain with a CT scan of the abdomen and pelvis showing what

appears to be a smoldering pancreatitis with multiple cystic lesions in the

body of the pancreas, peripancreatic inflammation and pancreatic duct

dilatation; no pancreatic duct stones were seen.  Unfortunately the patient

cannot undergo a MRCP due to the presence of a permanent pacemaker/AICD. 

She is hungry and asking for her diet to be advanced.



RECOMMENDATIONS:

1.  Slow diet advancement as tolerated.

2.  The patient will need referral to a tertiary pancreaticobiliary center

for an endoscopic ultrasound.





__________________________________________

Alexey Lakhani MD



DD:  08/07/2018 12:47:56

DT:  08/07/2018 12:50:32

Job # [id number]

## 2018-08-07 NOTE — CARD
--------------- APPROVED REPORT --------------





Date of service: 08/06/2018



EKG Measurement

Heart [id number]

ND 182P64

[id number]

[id number]

[id number]



<Conclusion>

Normal sinus rhythm

Septal infarct, age undetermined

T wave abnormality, consider anterior ischemia

Prolonged QT

Abnormal ECG

## 2018-08-07 NOTE — CP.PCM.PN
<Elias Abdalla - Last Filed: 08/07/18 15:36>





Subjective





- Date & Time of Evaluation


Date of Evaluation: 08/07/18


Time of Evaluation: 14:34





- Subjective


Subjective: 


Elias Abdalla, PGY-1, Internal Medicine Progress Note





Patient seen and examined this morning. Patient had no significant overnight 

events. Patient reported abdominal pain improved with morphine. Patient reports 

2 episodes of diarrhea after intake of contrast for CT yesterday and back pain 

reproducible on palpation. Patient denies headache, dizziness, chest pain, 

heart palpitations, shortness of breath, nausea, vomiting, constipation, dysuria

, and hematuria.








Objective





- Vital Signs/Intake and Output


Vital Signs (last 24 hours): 


 











Temp Pulse Resp BP Pulse Ox


 


 98.3 F   61   20   100/64   96 


 


 08/07/18 08:17  08/07/18 10:18  08/07/18 08:17  08/07/18 10:18  08/07/18 08:17








Intake and Output: 


 











 08/07/18 08/07/18





 06:59 18:59


 


Intake Total 450 


 


Balance 450 














- Medications


Medications: 


 Current Medications





Alprazolam (Xanax)  0.25 mg PO TID PRN; Protocol


   PRN Reason: Anxiety


   Stop: 08/14/18 10:01


Amiodarone HCl (Cordarone)  100 mg PO DAILY [facility]


   Last Admin: 08/07/18 10:18 Dose:  100 mg


Enoxaparin Sodium (Lovenox)  40 mg SC DAILY [facility]


   PRN Reason: Protocol


   Last Admin: 08/07/18 10:22 Dose:  40 mg


Metoprolol Succinate (Toprol Xl)  100 mg PO K [facility]


   Last Admin: 08/07/18 08:36 Dose:  100 mg


Morphine Sulfate (Morphine)  2 mg IVP Q4 PRN


   PRN Reason: Pain, moderate (4-7)


   Last Admin: 08/07/18 12:47 Dose:  2 mg


Ondansetron HCl (Zofran Inj)  4 mg IVP Q8 PRN


   PRN Reason: Nausea/Vomiting


Pantoprazole Sodium (Protonix Inj)  40 mg IVP DAILY [facility]


   Last Admin: 08/07/18 10:21 Dose:  40 mg











- Labs


Labs: 


 





 08/07/18 07:47 





 08/07/18 10:30 











- Constitutional


Appears: Well, Non-toxic





- Head Exam


Head Exam: ATRAUMATIC, NORMOCEPHALIC





- Eye Exam


Eye Exam: EOMI


Pupil Exam: PERRL





- Respiratory Exam


Respiratory Exam: Clear to Ausculation Bilateral, NORMAL BREATHING PATTERN





- Cardiovascular Exam


Cardiovascular Exam: REGULAR RHYTHM, RRR





- GI/Abdominal Exam


GI & Abdominal Exam: Soft, Tenderness


Additional comments: 


Diffuse but worse at epigastric region








- Extremities Exam


Extremities Exam: Full ROM





- Neurological Exam


Neurological Exam: Alert, Awake, CN II-XII Intact, Oriented x3


Neuro motor strength exam: Left Upper Extremity: 5, Right Upper Extremity: 5, 

Left Lower Extremity: 5, Right Lower Extremity: 5





- Psychiatric Exam


Psychiatric exam: Normal Affect, Normal Mood





- Skin


Skin Exam: Dry, Intact, Normal Color





Assessment and Plan





- Assessment and Plan (Free Text)


Assessment: 


63 year old female with a past medical history of pancreatitis in 2017, gastric 

ulcers, ventricular tachycardia s/p pacemaker insertion, hypertension, and GERD 

presents with abdominal pain for 2-3 months and 20 pound weight loss in 2 

months.








Plan: 


Pancreatic Mass 


-Patient has history of pancreatitis in 2017. Patient has 40+ pack year history 

and has 2-3 drinks few times a week for 40+ years


-Chest/Abd/Pelvic CT (8/6): ill-defined pancreatic and peripancreatic 

inflammatory changes of acute pancreatitis, inflammatory/infiltrative changes 

extend from the pancreas to the lesser curve. Indirect findings of pancreatic 

neoplasm including splenic vein thrombosis, dilatation of the pancreatic duct, 

multiple cystic masses in the pancreas, cystic lesions in the pancreas not 

previously seen and therefore suspicious for metastatic disease. These are not 

typical for embolic phenomenon/infarcts.


-Lipase: 823


-IV fluids NS 0.9% 1 L


-Morphine 2 mg Q4PRN for pain


-Zofran 4 mg Q8PRN for pain


-Clear liquid diet progressed to full liquid diet.


-Dr. Lakhani consulted for GI. Dr. Lakhani does not do EUS procedures so possible 

intervention by interventional radiology for biopsy of pancreas. If patient is 

to have procedure, patient should be placed NPO.





Hypokalemia


-Potassium: 2.4 on 8/6 at 22:41. 80 mEq of KDUR IV given last night. Current 

potassium is 3.7.


-Baseline is 3.2-3.9.


-Continue to monitor.





Normocytic anemia


-Hgb: 10.0. Baseline is 11.5-12.7 in 2017.


-Continue to monitor. 


-Patient has history of peptic ulcer disease. If hemoglobin continues to 

downtrend, consider endoscopy.





Hx of ventricular tachycardia


-continue home amiodarone and metoprolol





Hx of hypertension


-continue home metoprolol





Hx of Anxiety


-alprazolam 0.25 mg PO TID PRN





GI prophylaxis: protonix 40 mg daily


DVT prophylaxis: lovenox 40 mg daily








<Eben Temple - Last Filed: 08/08/18 07:56>





Objective





- Vital Signs/Intake and Output


Vital Signs (last 24 hours): 


 











Temp Pulse Resp BP Pulse Ox


 


 98.2 F   66   20   100/63   97 


 


 08/07/18 17:06  08/07/18 17:06  08/07/18 17:06  08/07/18 17:06  08/07/18 17:06








Intake and Output: 


 











 08/08/18 08/08/18





 06:59 18:59


 


Intake Total 340 


 


Balance 340 














- Medications


Medications: 


 Current Medications





Alprazolam (Xanax)  0.25 mg PO TID PRN; Protocol


   PRN Reason: Anxiety


   Stop: 08/14/18 10:01


Amiodarone HCl (Cordarone)  100 mg PO DAILY [facility]


   Last Admin: 08/07/18 10:18 Dose:  100 mg


Enoxaparin Sodium (Lovenox)  40 mg SC DAILY [facility]


   PRN Reason: Protocol


   Last Admin: 08/07/18 10:22 Dose:  40 mg


Metoprolol Succinate (Toprol Xl)  100 mg PO BRK [facility]


   Last Admin: 08/07/18 08:36 Dose:  100 mg


Morphine Sulfate (Morphine)  2 mg IVP Q4 PRN


   PRN Reason: Pain, moderate (4-7)


   Last Admin: 08/08/18 06:02 Dose:  2 mg


Ondansetron HCl (Zofran Inj)  4 mg IVP Q8 PRN


   PRN Reason: Nausea/Vomiting


Pantoprazole Sodium (Protonix Inj)  40 mg IVP DAILY [facility]


   Last Admin: 08/07/18 10:21 Dose:  40 mg











- Labs


Labs: 


 





 08/08/18 06:05 





 08/08/18 06:05 











Attending/Attestation





- Attestation


I have personally seen and examined this patient.: Yes


I have fully participated in the care of the patient.: Yes


I have reviewed all pertinent clinical information, including history, physical 

exam and plan: Yes


Notes (Text): 





08/08/18 07:52








Attending note;





Patient seen and examined with resident in the morning.


The patient was again seen in the afternoon with .


Patient's  by the bedside.





Patient is a 63 year old female with a past medical history of pancreatitis in 

2017, gastric ulcers, ventricular tachycardia s/p pacemaker insertion, 

hypertension, active smoking , history of alcohol abuse and GERD presents with 

abdominal pain for 2-3 months and 20 pound weight loss in 2 months.


CT abdomen and pelvis showed ill-defined pancreatic and peripancreatic 

inflammation with a suspicion of pancreatitis with pseudocyst versus pancreatic 

mass.


GI evaluation appreciated.


CAT scan reviewed with interventional radiology on GI.


Case discussed with DR. Rosen in [facility].


Patient is given a contact number to make appointment with [facility] / GI for 

outpatient EUS and biopsy.





Started on clear liquid diet.


Advance as tolerated.





History of arrhythmias; status post pacemaker. Stable cardiac status.





History of smoking; smoking cessation is strongly advised.


History of alcohol abuse; patient stop alcohol use few months ago.





Monitor closely.





Possible discharge with close outpatient follow-up for EUS and biposy at [facility].





Case discussed with PMD in detail.





Upon discharge patient will follow up with PMD .

## 2018-08-08 VITALS
HEART RATE: 60 BPM | SYSTOLIC BLOOD PRESSURE: 139 MMHG | DIASTOLIC BLOOD PRESSURE: 73 MMHG | TEMPERATURE: 99 F | OXYGEN SATURATION: 95 %

## 2018-08-08 LAB
ALBUMIN SERPL-MCNC: 2.8 G/DL (ref 3–4.8)
ALBUMIN/GLOB SERPL: 0.9 {RATIO} (ref 1.1–1.8)
ALT SERPL-CCNC: 22 U/L (ref 7–56)
AST SERPL-CCNC: 16 U/L (ref 14–36)
BASOPHILS # BLD AUTO: 0.03 K/MM3 (ref 0–2)
BASOPHILS NFR BLD: 0.4 % (ref 0–3)
BUN SERPL-MCNC: 6 MG/DL (ref 7–21)
CALCIUM SERPL-MCNC: 8.6 MG/DL (ref 8.4–10.5)
EOSINOPHIL # BLD: 0.2 10*3/UL (ref 0–0.7)
EOSINOPHIL NFR BLD: 3.1 % (ref 1.5–5)
ERYTHROCYTE [DISTWIDTH] IN BLOOD BY AUTOMATED COUNT: 15.9 % (ref 11.5–14.5)
GFR NON-AFRICAN AMERICAN: > 60
GRANULOCYTES # BLD: 4.46 10*3/UL (ref 1.4–6.5)
GRANULOCYTES NFR BLD: 65.9 % (ref 50–68)
HGB BLD-MCNC: 9.6 G/DL (ref 12–16)
LYMPHOCYTES # BLD: 1.4 10*3/UL (ref 1.2–3.4)
LYMPHOCYTES NFR BLD AUTO: 20.7 % (ref 22–35)
MCH RBC QN AUTO: 33.3 PG (ref 25–35)
MCHC RBC AUTO-ENTMCNC: 33.9 G/DL (ref 31–37)
MCV RBC AUTO: 98.3 FL (ref 80–105)
MONOCYTES # BLD AUTO: 0.7 10*3/UL (ref 0.1–0.6)
MONOCYTES NFR BLD: 9.9 % (ref 1–6)
PLATELET # BLD: 304 10^3/UL (ref 120–450)
PMV BLD AUTO: 10.2 FL (ref 7–11)
RBC # BLD AUTO: 2.88 10^6/UL (ref 3.5–6.1)
WBC # BLD AUTO: 6.8 10^3/UL (ref 4.5–11)

## 2018-08-08 RX ADMIN — METOPROLOL SUCCINATE SCH MG: 100 TABLET, EXTENDED RELEASE ORAL at 08:39

## 2018-08-08 RX ADMIN — MORPHINE SULFATE PRN MG: 2 INJECTION, SOLUTION INTRAMUSCULAR; INTRAVENOUS at 06:02

## 2018-08-08 RX ADMIN — ENOXAPARIN SODIUM SCH: 40 INJECTION SUBCUTANEOUS at 10:34

## 2018-08-08 NOTE — CP.PCM.DIS
<LaraedouardElias huizar - Last Filed: 08/08/18 16:31>





Provider





- Provider


Date of Admission: 


08/06/18 17:50





Attending physician: 


Eben Temple MD





Primary care physician: 


PCP: Dr. Hernandez





Consults: 


GI: Dr. Lakhani





Time Spent in preparation of Discharge (in minutes): 100





Diagnosis





- Discharge Diagnosis


(1) Pancreatic mass


Status: Acute   Priority: High   





Hospital Course





- Lab Results


Lab Results: 


 Most Recent Lab Values











WBC  6.8 10^3/ul (4.5-11.0)   08/08/18  06:05    


 


RBC  2.88 10^6/uL (3.5-6.1)  L  08/08/18  06:05    


 


Hgb  9.6 g/dL (12.0-16.0)  L  08/08/18  06:05    


 


Hct  28.3 % (36.0-48.0)  L  08/08/18  06:05    


 


MCV  98.3 fl (80.0-105.0)   08/08/18  06:05    


 


MCH  33.3 pg (25.0-35.0)   08/08/18  06:05    


 


MCHC  33.9 g/dl (31.0-37.0)   08/08/18  06:05    


 


RDW  15.9 % (11.5-14.5)  H  08/08/18  06:05    


 


Plt Count  304 10^3/uL (120.0-450.0)   08/08/18  06:05    


 


MPV  10.2 fl (7.0-11.0)   08/08/18  06:05    


 


Gran %  65.9 % (50.0-68.0)   08/08/18  06:05    


 


Lymph % (Auto)  20.7 % (22.0-35.0)  L  08/08/18  06:05    


 


Mono % (Auto)  9.9 % (1.0-6.0)  H  08/08/18  06:05    


 


Eos % (Auto)  3.1 % (1.5-5.0)   08/08/18  06:05    


 


Baso % (Auto)  0.4 % (0.0-3.0)   08/08/18  06:05    


 


Gran #  4.46  (1.4-6.5)   08/08/18  06:05    


 


Lymph # (Auto)  1.4  (1.2-3.4)   08/08/18  06:05    


 


Mono # (Auto)  0.7  (0.1-0.6)  H  08/08/18  06:05    


 


Eos # (Auto)  0.2  (0.0-0.7)   08/08/18  06:05    


 


Baso # (Auto)  0.03 K/mm3 (0.0-2.0)   08/08/18  06:05    


 


Sodium  140 mmol/L (132-148)   08/08/18  06:05    


 


Potassium  4.6 mmol/L (3.6-5.0)   08/08/18  06:05    


 


Chloride  105 mmol/L ()   08/08/18  06:05    


 


Carbon Dioxide  30 mmol/L (21-33)   08/08/18  06:05    


 


Anion Gap  10  (10-20)   08/08/18  06:05    


 


BUN  6 mg/dL (7-21)  L  08/08/18  06:05    


 


Creatinine  0.6 mg/dl (0.7-1.2)  L  08/08/18  06:05    


 


Est GFR ( Amer)  > 60   08/08/18  06:05    


 


Est GFR (Non-Af Amer)  > 60   08/08/18  06:05    


 


Random Glucose  98 mg/dL ()   08/08/18  06:05    


 


Calcium  8.6 mg/dL (8.4-10.5)   08/08/18  06:05    


 


Phosphorus  2.9 mg/dL (2.5-4.5)   08/08/18  06:05    


 


Magnesium  1.9 mg/dL (1.7-2.2)   08/08/18  06:05    


 


Total Bilirubin  0.5 mg/dL (0.2-1.3)   08/08/18  06:05    


 


Direct Bilirubin  0.2 mg/dL (0.0-0.4)   08/06/18  15:05    


 


AST  16 U/L (14-36)   08/08/18  06:05    


 


ALT  22 U/L (7-56)   08/08/18  06:05    


 


Alkaline Phosphatase  90 U/L ()   08/08/18  06:05    


 


Total Protein  5.7 g/dL (5.8-8.3)  L  08/08/18  06:05    


 


Albumin  2.8 g/dL (3.0-4.8)  L  08/08/18  06:05    


 


Globulin  2.9 gm/dL  08/08/18  06:05    


 


Albumin/Globulin Ratio  0.9  (1.1-1.8)  L  08/08/18  06:05    


 


Lipase  823 U/L ()  H  08/06/18  15:05    


 


Urine Color  Yellow  (YELLOW)   08/06/18  15:50    


 


Urine Appearance  Turbid  (CLEAR)   08/06/18  15:50    


 


Urine pH  6.5  (4.7-8.0)   08/06/18  15:50    


 


Ur Specific Gravity  1.025  (1.005-1.035)   08/06/18  15:50    


 


Urine Protein  100 mg/dL (<30 mg/dL)  H  08/06/18  15:50    


 


Urine Glucose (UA)  Negative mg/dL (NEGATIVE)   08/06/18  15:50    


 


Urine Ketones  15 mg/dL (NEGATIVE)  H  08/06/18  15:50    


 


Urine Blood  Negative  (NEGATIVE)   08/06/18  15:50    


 


Urine Nitrate  Negative  (NEGATIVE)   08/06/18  15:50    


 


Urine Bilirubin  Moderate  (NEGATIVE)  H  08/06/18  15:50    


 


Urine Urobilinogen  0.2 E.U./dL (<1 E.U./dL)   08/06/18  15:50    


 


Ur Leukocyte Esterase  Trace Colleen/uL (NEGATIVE)  H  08/06/18  15:50    


 


Urine RBC  Negative /hpf (0-2)   08/06/18  15:50    


 


Urine WBC  5 - 10 /hpf (0-6)   08/06/18  15:50    


 


Ur Epithelial Cells  3 - 4 /hpf (0-5)   08/06/18  15:50    


 


Urine Bacteria  Trace  (NEG)   08/06/18  15:50    


 


Hyaline Casts  2 - 5 /hpf  08/06/18  15:50    














- Hospital Course


Hospital Course: 


63 year old female with a past medical history of pancreatitis in 2017, gastric 

ulcers, ventricular tachycardia status post pacemaker, hypertension, and GERD 

presents with abdominal pain and 20 pound weight loss on 8/6. Chest/abdominal/

pelvis CT on 8/6 showed ill-defined pancreatic and peripancreatic inflammatory 

changes of acute pancreatitis, inflammatory/infiltrative changes that extend 

from the pancreas to the lesser curve. Indirect findings of pancreatic neoplasm 

including splenic vein thrombosis, dilatation of the pancreatic duct, multiple 

cystic masses in the pancreas, cystic lesions in the pancreas not previously 

seen and therefore was suspicious for metastatic disease. These were not 

typical for embolic phenomenon/infarcts. Lipase was elevated at 823. IV fluids 

NS 0.9%, morphine 2 mg Q4PRN for pain, zofran 4 mg Q8PRN for pain, and clear 

liquid diet were started. Dr. Lakhani was consulted for GI. EKG on 8/6 showed 

normal sinus rhythm with HR of 65 and QTc of 513 but patient did not report 

cardiac symptoms. Patient had hypokalemia at K level of 2.4 on 8/6. Her 

potassium was repleted with 80 mEq of potassium. Subsequently, her potassium 

was 3.8 and 3.7 on 8/7. Patient was continued on her home amiodarone and 

metoprolol for history of ventricular tachycardia and hypertension. Patient was 

prescribed alprazolam 0.25 mg PO TID PRN for history of anxiety. On 8/8, 

patient complained of left calf pain. Duplex ultrasound of bilateral lower 

extremities showed no evidence of DVT. Dr. Lakhani recommended patient be 

transferred to a tertiary pancreaticobiliary center for EUS for further 

exploration of pancreatic mass seen on CT.





Patient was told to follow up with PCP, Dr. Hernandez, within 3 to 5 days. 

In addition, patient was scheduled for follow up with Dr. Bass for scheduling 

for EUS for pancreatic lesion. Patient was told to follow up with Dr. Lakhani. 

Patient was told to return to emergency room if symptoms reoccured. Patient was 

told to take all home medications as prescribed by PCP.





This is a brief summary of the events that transpired at the hospital. Refer to 

the hospital notes for full story.








- Date & Time of H&P


Date of H&P: 08/06/18


Time of H&P: 18:31





Discharge Exam





- Head Exam


Head Exam: ATRAUMATIC, NORMOCEPHALIC





- Eye Exam


Eye Exam: EOMI


Pupil Exam: PERRL





- Respiratory Exam


Respiratory Exam: Clear to PA & Lateral, UNREMARKABLE





- Cardiovascular Exam


Cardiovascular Exam: REGULAR RHYTHM





- GI/Abdominal Exam


GI & Abdominal Exam: Firm, Normal Bowel Sounds, Tenderness


Additional comments: 


firm in middle of abdomen. Outer abdomen is softer








- Extremities Exam


Extremities exam: full ROM





- Neurological Exam


Neurological exam: Alert, CN II-XII Intact, Oriented x3





- Psychiatric Exam


Psychiatric exam: Normal Affect, Normal Mood





- Skin


Skin Exam: Dry, Intact, Normal Color





Discharge Plan





- Follow Up Plan


Condition: GOOD


Disposition: HOME/ ROUTINE


Instructions:  Pancreatitis, Syncope (Fainting)


Additional Instructions: 


Please follow up with your Primary medical doctor within 3 to 5 days.


 [place] take all home medications as previously prescribed. Please follow up 

with Dr. Bass office for scheduling for UES of lesion. 


Please follow up with Dr. Lakhani in his clinic. 


If symptoms reoccur please return to nearest emergency room.  


Referrals: 


Alexey Lakhani MD [Staff Provider] - 


Amanda Chavez MD [Family Provider] - 





<Eben Temple - Last Filed: 08/09/18 07:34>





Provider





- Provider


Date of Admission: 


08/06/18 17:50





Attending physician: 


Eben Temple MD








Hospital Course





- Lab Results


Lab Results: 


 Micro Results





08/06/18 20:19   Urine,Clean Catch   Urine Culture - Preliminary


                            Gram Negative Cordell


                            Gram Positive Cocci





 Most Recent Lab Values











WBC  6.8 10^3/ul (4.5-11.0)   08/08/18  06:05    


 


RBC  2.88 10^6/uL (3.5-6.1)  L  08/08/18  06:05    


 


Hgb  9.6 g/dL (12.0-16.0)  L  08/08/18  06:05    


 


Hct  28.3 % (36.0-48.0)  L  08/08/18  06:05    


 


MCV  98.3 fl (80.0-105.0)   08/08/18  06:05    


 


MCH  33.3 pg (25.0-35.0)   08/08/18  06:05    


 


MCHC  33.9 g/dl (31.0-37.0)   08/08/18  06:05    


 


RDW  15.9 % (11.5-14.5)  H  08/08/18  06:05    


 


Plt Count  304 10^3/uL (120.0-450.0)   08/08/18  06:05    


 


MPV  10.2 fl (7.0-11.0)   08/08/18  06:05    


 


Gran %  65.9 % (50.0-68.0)   08/08/18  06:05    


 


Lymph % (Auto)  20.7 % (22.0-35.0)  L  08/08/18  06:05    


 


Mono % (Auto)  9.9 % (1.0-6.0)  H  08/08/18  06:05    


 


Eos % (Auto)  3.1 % (1.5-5.0)   08/08/18  06:05    


 


Baso % (Auto)  0.4 % (0.0-3.0)   08/08/18  06:05    


 


Gran #  4.46  (1.4-6.5)   08/08/18  06:05    


 


Lymph # (Auto)  1.4  (1.2-3.4)   08/08/18  06:05    


 


Mono # (Auto)  0.7  (0.1-0.6)  H  08/08/18  06:05    


 


Eos # (Auto)  0.2  (0.0-0.7)   08/08/18  06:05    


 


Baso # (Auto)  0.03 K/mm3 (0.0-2.0)   08/08/18  06:05    


 


Sodium  140 mmol/L (132-148)   08/08/18  06:05    


 


Potassium  4.6 mmol/L (3.6-5.0)   08/08/18  06:05    


 


Chloride  105 mmol/L ()   08/08/18  06:05    


 


Carbon Dioxide  30 mmol/L (21-33)   08/08/18  06:05    


 


Anion Gap  10  (10-20)   08/08/18  06:05    


 


BUN  6 mg/dL (7-21)  L  08/08/18  06:05    


 


Creatinine  0.6 mg/dl (0.7-1.2)  L  08/08/18  06:05    


 


Est GFR ( Amer)  > 60   08/08/18  06:05    


 


Est GFR (Non-Af Amer)  > 60   08/08/18  06:05    


 


Random Glucose  98 mg/dL ()   08/08/18  06:05    


 


Calcium  8.6 mg/dL (8.4-10.5)   08/08/18  06:05    


 


Phosphorus  2.9 mg/dL (2.5-4.5)   08/08/18  06:05    


 


Magnesium  1.9 mg/dL (1.7-2.2)   08/08/18  06:05    


 


Total Bilirubin  0.5 mg/dL (0.2-1.3)   08/08/18  06:05    


 


Direct Bilirubin  0.2 mg/dL (0.0-0.4)   08/06/18  15:05    


 


AST  16 U/L (14-36)   08/08/18  06:05    


 


ALT  22 U/L (7-56)   08/08/18  06:05    


 


Alkaline Phosphatase  90 U/L ()   08/08/18  06:05    


 


Total Protein  5.7 g/dL (5.8-8.3)  L  08/08/18  06:05    


 


Albumin  2.8 g/dL (3.0-4.8)  L  08/08/18  06:05    


 


Globulin  2.9 gm/dL  08/08/18  06:05    


 


Albumin/Globulin Ratio  0.9  (1.1-1.8)  L  08/08/18  06:05    


 


Lipase  823 U/L ()  H  08/06/18  15:05    


 


Urine Color  Yellow  (YELLOW)   08/06/18  15:50    


 


Urine Appearance  Turbid  (CLEAR)   08/06/18  15:50    


 


Urine pH  6.5  (4.7-8.0)   08/06/18  15:50    


 


Ur Specific Gravity  1.025  (1.005-1.035)   08/06/18  15:50    


 


Urine Protein  100 mg/dL (<30 mg/dL)  H  08/06/18  15:50    


 


Urine Glucose (UA)  Negative mg/dL (NEGATIVE)   08/06/18  15:50    


 


Urine Ketones  15 mg/dL (NEGATIVE)  H  08/06/18  15:50    


 


Urine Blood  Negative  (NEGATIVE)   08/06/18  15:50    


 


Urine Nitrate  Negative  (NEGATIVE)   08/06/18  15:50    


 


Urine Bilirubin  Moderate  (NEGATIVE)  H  08/06/18  15:50    


 


Urine Urobilinogen  0.2 E.U./dL (<1 E.U./dL)   08/06/18  15:50    


 


Ur Leukocyte Esterase  Trace Colleen/uL (NEGATIVE)  H  08/06/18  15:50    


 


Urine RBC  Negative /hpf (0-2)   08/06/18  15:50    


 


Urine WBC  5 - 10 /hpf (0-6)   08/06/18  15:50    


 


Ur Epithelial Cells  3 - 4 /hpf (0-5)   08/06/18  15:50    


 


Urine Bacteria  Trace  (NEG)   08/06/18  15:50    


 


Hyaline Casts  2 - 5 /hpf  08/06/18  15:50    














Attending/Attestation





- Attestation


I have personally seen and examined this patient.: Yes


I have fully participated in the care of the patient.: Yes


I have reviewed all pertinent clinical information, including history, physical 

exam and plan: Yes


Notes (Text): 





08/09/18 07:31








Attending note;





Patient seen and examined with resident in the morning.





Abdominal pain improved significantly.


Tolerating diet.


Advance to low-fat and low-cholesterol diet today.





Patient is a 63 year old female with a past medical history of pancreatitis in 

2017, gastric ulcers, ventricular tachycardia s/p pacemaker insertion, 

hypertension, active smoking , history of alcohol abuse and GERD presents with 

abdominal pain for 2-3 months and 20 pound weight loss in 2 months.


CT abdomen and pelvis showed ill-defined pancreatic and peripancreatic 

inflammation with a suspicion of pancreatitis with pseudocyst versus pancreatic 

mass.


GI evaluation appreciated.


CAT scan reviewed with interventional radiology on GI.


Case discussed with DR. Rosen in [facility].


Patient made an appointment with [facility] / GI for outpatient EUS and biopsy.





History of arrhythmias; status post pacemaker. Stable cardiac status.


Follow-up with cardiology as outpatient.





History of smoking; smoking cessation is strongly advised.


History of alcohol abuse; patient stop alcohol use few months ago.





Case discussed with PMD in detail.





Follow-up with GI Dr. Lakhani.





Upon discharge patient will follow up with PMD .





Follow-up with [facility].


08/09/18 07:34

## 2018-08-08 NOTE — US
HISTORY:

Leg pain and swelling. Evaluate for DVT



PHYSICIAN(S):  Yousif Abreu MD.



TECHNIQUE:

Duplex sonography and color-flow Doppler with graded compression were 

used to evaluate the deep venous systems of both lower extremities. 



FINDINGS:

The visualized deep venous systems of both lower extremities are 

sonographically normal and compressible. Normal wave forms and 

augmentation are seen. There is no sonographic evidence for deep 

venous thrombosis in the visualized segments of both lower 

extremities.



IMPRESSION:

No sonographic evidence for deep venous thrombosis in the visualized 

segments of both lower extremities.

## 2018-08-08 NOTE — PN
Copied To:  Alexey Lakhani MD

Attending MD:  Alexey Lakhani MD



DATE:  08/08/2018



SUBJECTIVE:  The patient is lying in bed.  She states that she feels much

better with less abdominal pain.  She denies any nausea, vomiting, fevers

or chills.



PHYSICAL EXAMINATION:

VITAL SIGNS:  Reveal temperature of 99, blood pressure 139/73, heart rate

of 60.

HEENT:  Reveals sclerae to be white.  Conjunctivae pink.

NECK:  Supple.

CHEST:  Reveal lungs to be clear.  She has a left subclavian permanent

pacemaker/AICD.

LUNGS:  Clear.

HEART:  Reveals regular rate and rhythm.

ABDOMEN:  Soft, nontender.

EXTREMITIES:  Show no edema.



LABORATORY DATA:  Reveals white blood cell count 6.8, hemoglobin 9.6.  This

is from this morning.  Chemistries reveal BUN 6, creatinine 0.6, normal

electrolytes.



IMPRESSION:  Acute pancreatitis with multiple cysts seen in the pancreas. 

These may be either pseudocysts or possibly intraductal papillary mucinous

neoplasm.



RECOMMENDATIONS:

1.  I will advance the patient to a low-fat diet.

2.  The patient will need to be seen at a tertiary Pancreatobiliary Center

at [facility] in [place] for endoscopic ultrasound.  She will

be given information to see Dr. Conway for EUS  I have stressed the

importance of this to the patient as a possible pancreatic neoplasm needs

to be ruled out.





__________________________________________

Alexey Lakhani MD





DD:  08/08/2018 7:59:23

DT:  08/08/2018 8:00:56

Job # [id number]

## 2019-02-12 ENCOUNTER — HOSPITAL ENCOUNTER (EMERGENCY)
Dept: HOSPITAL 42 - ED | Age: 64
Discharge: HOME | End: 2019-02-12
Payer: MEDICARE

## 2019-02-12 VITALS
SYSTOLIC BLOOD PRESSURE: 139 MMHG | DIASTOLIC BLOOD PRESSURE: 81 MMHG | HEART RATE: 82 BPM | OXYGEN SATURATION: 99 % | TEMPERATURE: 98.6 F

## 2019-02-12 VITALS — BODY MASS INDEX: 17.3 KG/M2

## 2019-02-12 VITALS — RESPIRATION RATE: 18 BRPM

## 2019-02-12 DIAGNOSIS — M19.90: ICD-10-CM

## 2019-02-12 DIAGNOSIS — F17.210: ICD-10-CM

## 2019-02-12 DIAGNOSIS — L03.114: Primary | ICD-10-CM

## 2019-02-12 DIAGNOSIS — I10: ICD-10-CM

## 2019-02-12 LAB
BASOPHILS # BLD AUTO: 0.03 K/MM3 (ref 0–2)
BASOPHILS NFR BLD: 0.4 % (ref 0–3)
BUN SERPL-MCNC: 14 MG/DL (ref 7–21)
CALCIUM SERPL-MCNC: 9.9 MG/DL (ref 8.4–10.5)
EOSINOPHIL # BLD: 0.2 10*3/UL (ref 0–0.7)
EOSINOPHIL NFR BLD: 2.7 % (ref 1.5–5)
ERYTHROCYTE [DISTWIDTH] IN BLOOD BY AUTOMATED COUNT: 15.4 % (ref 11.5–14.5)
ERYTHROCYTE [SEDIMENTATION RATE] IN BLOOD: 39 MM/HR (ref 0–20)
GFR NON-AFRICAN AMERICAN: > 60
HGB BLD-MCNC: 12.7 G/DL (ref 12–16)
LYMPHOCYTES # BLD: 1.8 10*3/UL (ref 1.2–3.4)
LYMPHOCYTES NFR BLD AUTO: 21.4 % (ref 22–35)
MCH RBC QN AUTO: 33.3 PG (ref 25–35)
MCHC RBC AUTO-ENTMCNC: 33.2 G/DL (ref 31–37)
MCV RBC AUTO: 100.5 FL (ref 80–105)
MONOCYTES # BLD AUTO: 0.5 10*3/UL (ref 0.1–0.6)
MONOCYTES NFR BLD: 5.6 % (ref 1–6)
PLATELET # BLD: 263 10^3/UL (ref 120–450)
PMV BLD AUTO: 10.3 FL (ref 7–11)
RBC # BLD AUTO: 3.81 10^6/UL (ref 3.5–6.1)
URATE SERPL-MCNC: 5.3 MG/DL (ref 2.5–6.2)
WBC # BLD AUTO: 8.2 10^3/UL (ref 4.5–11)

## 2019-02-12 NOTE — ED PDOC
Arrival/HPI





- General


Historian: Patient





- Critical Care


Narrative Critical Care (Text): 





02/12/19 16:32


63 year old female with PMH of HTN, GERD, pancreatitis, gastric ulcers, 

ventricular tachycardia s/p pacemaker presents with progressive left lateral 

wrist  swelling/pain for 2 days. Pain is aching, constant, 7/10, no radiation, 

partially relieved with tylenol, aggrevated with hand movement. She denied 

associated fever, chills, muscle weakness, loss of sensation, chest pain, SOB





02/12/19 16:34




















- History of Present Illness


Time/Duration: < week


Symptom Onset: Gradual


Symptom Course: Worsening


Quality: Aching


Severity Level: 7





<Ritesh Sanabria - Last Filed: 02/12/19 18:08>





<Arsenio Darden - Last Filed: 02/12/19 21:41>





- General


Time Seen by Provider: 02/12/19 16:19





Past Medical History





- Infectious Disease


Hx of Infectious Diseases: None





- Tetanus Immunization


Tetanus Immunization: Unknown





- Reproductive


Menopause: Yes





- Cardiac


Hx Cardiac Disorders: Yes


Hx Hypertension: Yes


Hx Pacemaker: Yes (w/defibrilator)





- Pulmonary


Hx Respiratory Disorders: Yes (SMOKES 1/2 A PACK TO 1 PPD.)





- Neurological


Hx Neurological Disorder: No





- HEENT


Hx HEENT Disorder: Yes (wears glasses)





- Renal


Hx Renal Disorder: No





- Endocrine/Metabolic


Hx Endocrine Disorders: No





- Hematological/Oncological


Hx Blood Disorders: No





- Integumentary


Hx Dermatological Disorder: Yes


Other/Comment: HEALED SKIN ABRASION TO RIGHT BACK OF LEG.





- Musculoskeletal/Rheumatological


Hx Musculoskeletal Disorders: Yes


Hx Arthritis: Yes ("all over")


Hx Fractures: Yes (L wrist casted, R ft metatarsal casted)


Hx Osteoarthritis: Yes ("all over")





- Gastrointestinal


Hx Gastrointestinal Disorders: Yes


Hx Gastroesophageal Reflux: Yes


Other/Comment: hemorrhoids a "long time ago"





- Genitourinary/Gynecological


Hx Genitourinary Disorders: No





- Psychiatric


Hx Psychophysiologic Disorder: Yes (ETOH -DAILY WINE,SMOKES CIGARETTES 1/2-1 

PPD.)


Hx Anxiety: Yes


Hx Substance Use: No





- Past Surgical History


Past Surgical History: Unable to Obtain





- Surgical History


Hx Cardiac Catheterization: Yes (2002 neg)


Hx Orthopedic Surgery: Yes (herniated disc c3 and 4,right hip replacement)


Other/Comment: R hip replacement





- Anesthesia


Hx Anesthesia: No


Hx Anesthesia Reactions: No


Hx Malignant Hyperthermia: No





- Suicidal Assessment


Feels Threatened In Home Enviroment: No





<Ritesh Sanabria - Last Filed: 02/12/19 18:08>





Family/Social History


Smoking Status: Current Some Days Smoker


Hx Alcohol Use: No


Hx Substance Use: No


Hx Substance Use Treatment: No





<Ritesh Sanabria - Last Filed: 02/12/19 18:08>


Family/Social History: No Known Family HX





<Arsenio Darden - Last Filed: 02/12/19 21:41>





Allergies/Home Meds





<Ritesh Sanabria - Last Filed: 02/12/19 18:08>





<Arsenio Darden - Last Filed: 02/12/19 21:41>


Allergies/Adverse Reactions: 


Allergies





Penicillins Allergy (Verified 02/12/19 16:23)


   ANAPHYLAXIS








Home Medications: 


                                    Home Meds











 Medication  Instructions  Recorded  Confirmed


 


RX: Amiodarone HCl [Pacerone] 100 mg PO DAILY 05/29/17 02/12/19


 


RX: Metoprolol Succinate  mg PO DAILY 05/29/17 02/12/19





[Toprol XL]   


 


Metoprolol Keyes/Hydrochlorothiaz 1 tab PO DAILY 02/12/19 02/12/19





[Metoprolol ER-Hctz 100-12.5 mg]   














Physical Exam





Vital Signs











  Temp Pulse Resp BP Pulse Ox


 


 02/12/19 16:24  98.7 F  85  18  95/66 L  98














<Ritesh Sanabria - Last Filed: 02/12/19 18:08>





Vital Signs











  Temp Pulse Resp BP Pulse Ox


 


 02/12/19 16:24  98.7 F  85  18  95/66 L  98














<Arsenio Darden - Last Filed: 02/12/19 21:41>





Medical Decision Making


ED Course and Treatment: 





02/12/19 21:38


patient was seen for atraumatic thumb/wrist pain. no overt broken skin to 

suggest cellulitis. labs ok, patient afebrile. septic joint possible but not 

likely given the patient's clinical presentation. patient was empirically 

treated for cellulitis as there was clear redness pain and swelling of the skin 

overlying the posterior hypothenar eminence area. patient understood and agreed 

to plan and will return immediately for any new or worsening symptoms.





- RAD Interpretation


Narrative RAD Interpretations (Text): 





02/12/19 18:38


weist xr my read: no overt fracture or dislocation


Radiology Orders: 











02/12/19 16:55


WRIST, LEFT 3 VIEWS [RAD] Stat 











: ED Physician





<AdelsoArsenio - Last Filed: 02/12/19 21:41>





Disposition/Present on Arrival





- Present on Arrival


History of DVT/PE: No


History of Uncontrolled Diabetes: No


Urinary Catheter: No


History of Decub. Ulcer: No


History Surgical Site Infection Following: None





<Ritesh Sanabria - Last Filed: 02/12/19 18:08>





- Present on Arrival


Any Indicators Present on Arrival: No





- Disposition


Have Diagnosis and Disposition been Completed?: Yes


Disposition Time: 21:41 (not actual)


Patient Plan: Discharge





<Arsenio Darden - Last Filed: 02/12/19 21:41>





- Disposition


Diagnosis: 


 Cellulitis





Disposition: HOME/ ROUTINE


Condition: STABLE


Discharge Instructions (ExitCare):  Cellulitis (Skin Infection), Adult (DC), 

Cellulitis (ED)


Additional Instructions: 


return for any new or worsening symptoms especially increased pain, increased 

swelling, increased redness of the hand/wrist.


follow up with the hand specialist as discussed.


Prescriptions: 


RX: Clindamycin [Cleocin] 300 mg PO Q6H 7 Days #27 cap


RX: Lactobacillus Acidophilus [Acidophilus Lactobacilli] 2 each PO BID 14 Days 

#28 capsule


Referrals: 


Jyoti Vazquez MD [Staff Provider] - Follow up with primary


Mitesh Saab MD [Staff Provider] - Follow up with primary


Forms:  [place] (English)

## 2019-02-13 NOTE — RAD
Date of service: 



02/12/2019



PROCEDURE:  Left Wrist Radiographs.







HISTORY:

pain, focus lateral wrist



COMPARISON:

None.



FINDINGS:



BONES:

There is diffuse bone demineralization.  There is no acute displaced 

fracture or bone destruction.  Bone alignment is normal. 



JOINTS:

There is severe degenerative osteoarthrosis in the [facility] joint with 

severe reduced joint space and marginal spurring.  There 

calcification of the triangular fibrocartilage.  There is also mild 

degenerative osteoarthrosis in the radiocarpal joint.



SOFT TISSUES:

Normal. 



OTHER FINDINGS:

None.



IMPRESSION:

No acute displaced fracture or dislocation.



Severe degenerative osteoarthrosis in the [facility] joint.



Degenerative changes in the triangular fibrocartilage with 

calcification.



Mild degenerative osteoarthrosis in the radiocarpal joint.